# Patient Record
Sex: FEMALE | Race: WHITE | NOT HISPANIC OR LATINO | URBAN - METROPOLITAN AREA
[De-identification: names, ages, dates, MRNs, and addresses within clinical notes are randomized per-mention and may not be internally consistent; named-entity substitution may affect disease eponyms.]

---

## 2018-04-29 ENCOUNTER — INPATIENT (INPATIENT)
Facility: HOSPITAL | Age: 33
LOS: 0 days | Discharge: HOME | DRG: 781 | End: 2018-04-30
Attending: STUDENT IN AN ORGANIZED HEALTH CARE EDUCATION/TRAINING PROGRAM | Admitting: STUDENT IN AN ORGANIZED HEALTH CARE EDUCATION/TRAINING PROGRAM
Payer: COMMERCIAL

## 2018-04-29 VITALS
TEMPERATURE: 100 F | OXYGEN SATURATION: 98 % | DIASTOLIC BLOOD PRESSURE: 80 MMHG | SYSTOLIC BLOOD PRESSURE: 124 MMHG | HEART RATE: 121 BPM | RESPIRATION RATE: 20 BRPM

## 2018-04-29 DIAGNOSIS — N12 TUBULO-INTERSTITIAL NEPHRITIS, NOT SPECIFIED AS ACUTE OR CHRONIC: ICD-10-CM

## 2018-04-29 LAB
ALBUMIN SERPL ELPH-MCNC: 3 G/DL — LOW (ref 3.3–5)
ALBUMIN SERPL ELPH-MCNC: 3.9 G/DL — SIGNIFICANT CHANGE UP (ref 3.3–5)
ALP SERPL-CCNC: 48 U/L — SIGNIFICANT CHANGE UP (ref 40–120)
ALP SERPL-CCNC: 61 U/L — SIGNIFICANT CHANGE UP (ref 40–120)
ALT FLD-CCNC: 18 U/L — SIGNIFICANT CHANGE UP (ref 10–45)
ALT FLD-CCNC: 20 U/L — SIGNIFICANT CHANGE UP (ref 10–45)
ANION GAP SERPL CALC-SCNC: 12 MMOL/L — SIGNIFICANT CHANGE UP (ref 5–17)
ANION GAP SERPL CALC-SCNC: 13 MMOL/L — SIGNIFICANT CHANGE UP (ref 5–17)
APPEARANCE UR: ABNORMAL
AST SERPL-CCNC: 19 U/L — SIGNIFICANT CHANGE UP (ref 10–40)
AST SERPL-CCNC: 28 U/L — SIGNIFICANT CHANGE UP (ref 10–40)
BASE EXCESS BLDV CALC-SCNC: 0.7 MMOL/L — SIGNIFICANT CHANGE UP (ref -2–2)
BASOPHILS # BLD AUTO: 0 K/UL — SIGNIFICANT CHANGE UP (ref 0–0.2)
BASOPHILS NFR BLD AUTO: 0.2 % — SIGNIFICANT CHANGE UP (ref 0–2)
BILIRUB SERPL-MCNC: 0.2 MG/DL — SIGNIFICANT CHANGE UP (ref 0.2–1.2)
BILIRUB SERPL-MCNC: 0.6 MG/DL — SIGNIFICANT CHANGE UP (ref 0.2–1.2)
BILIRUB UR-MCNC: NEGATIVE — SIGNIFICANT CHANGE UP
BLD GP AB SCN SERPL QL: NEGATIVE — SIGNIFICANT CHANGE UP
BUN SERPL-MCNC: 5 MG/DL — LOW (ref 7–23)
BUN SERPL-MCNC: 9 MG/DL — SIGNIFICANT CHANGE UP (ref 7–23)
CA-I SERPL-SCNC: 1.13 MMOL/L — SIGNIFICANT CHANGE UP (ref 1.12–1.3)
CALCIUM SERPL-MCNC: 7.9 MG/DL — LOW (ref 8.4–10.5)
CALCIUM SERPL-MCNC: 9.1 MG/DL — SIGNIFICANT CHANGE UP (ref 8.4–10.5)
CHLORIDE BLDV-SCNC: 106 MMOL/L — SIGNIFICANT CHANGE UP (ref 96–108)
CHLORIDE SERPL-SCNC: 105 MMOL/L — SIGNIFICANT CHANGE UP (ref 96–108)
CHLORIDE SERPL-SCNC: 99 MMOL/L — SIGNIFICANT CHANGE UP (ref 96–108)
CO2 BLDV-SCNC: 25 MMOL/L — SIGNIFICANT CHANGE UP (ref 22–30)
CO2 SERPL-SCNC: 22 MMOL/L — SIGNIFICANT CHANGE UP (ref 22–31)
CO2 SERPL-SCNC: 23 MMOL/L — SIGNIFICANT CHANGE UP (ref 22–31)
COLOR SPEC: YELLOW — SIGNIFICANT CHANGE UP
CREAT SERPL-MCNC: 0.47 MG/DL — LOW (ref 0.5–1.3)
CREAT SERPL-MCNC: 0.53 MG/DL — SIGNIFICANT CHANGE UP (ref 0.5–1.3)
DIFF PNL FLD: ABNORMAL
EOSINOPHIL # BLD AUTO: 0 K/UL — SIGNIFICANT CHANGE UP (ref 0–0.5)
EOSINOPHIL NFR BLD AUTO: 0.3 % — SIGNIFICANT CHANGE UP (ref 0–6)
EPI CELLS # UR: SIGNIFICANT CHANGE UP /HPF
GAS PNL BLDV: 132 MMOL/L — LOW (ref 136–145)
GAS PNL BLDV: SIGNIFICANT CHANGE UP
GAS PNL BLDV: SIGNIFICANT CHANGE UP
GLUCOSE BLDV-MCNC: 116 MG/DL — HIGH (ref 70–99)
GLUCOSE SERPL-MCNC: 121 MG/DL — HIGH (ref 70–99)
GLUCOSE SERPL-MCNC: 170 MG/DL — HIGH (ref 70–99)
GLUCOSE UR QL: NEGATIVE — SIGNIFICANT CHANGE UP
HCG SERPL-ACNC: HIGH MIU/ML (ref 5–24)
HCO3 BLDV-SCNC: 24 MMOL/L — SIGNIFICANT CHANGE UP (ref 21–29)
HCT VFR BLD CALC: 32.1 % — LOW (ref 34.5–45)
HCT VFR BLD CALC: 32.5 % — LOW (ref 34.5–45)
HCT VFR BLD CALC: 39.2 % — SIGNIFICANT CHANGE UP (ref 34.5–45)
HCT VFR BLDA CALC: 40 % — SIGNIFICANT CHANGE UP (ref 39–50)
HGB BLD CALC-MCNC: 13.1 G/DL — SIGNIFICANT CHANGE UP (ref 11.5–15.5)
HGB BLD-MCNC: 11.2 G/DL — LOW (ref 11.5–15.5)
HGB BLD-MCNC: 11.2 G/DL — LOW (ref 11.5–15.5)
HGB BLD-MCNC: 13.4 G/DL — SIGNIFICANT CHANGE UP (ref 11.5–15.5)
KETONES UR-MCNC: ABNORMAL
LACTATE BLDV-MCNC: 1.5 MMOL/L — SIGNIFICANT CHANGE UP (ref 0.7–2)
LACTATE SERPL-SCNC: 1.3 MMOL/L — SIGNIFICANT CHANGE UP (ref 0.7–2)
LEUKOCYTE ESTERASE UR-ACNC: ABNORMAL
LIDOCAIN IGE QN: 14 U/L — SIGNIFICANT CHANGE UP (ref 7–60)
LYMPHOCYTES # BLD AUTO: 0.4 K/UL — LOW (ref 1–3.3)
LYMPHOCYTES # BLD AUTO: 3 % — LOW (ref 13–44)
MCHC RBC-ENTMCNC: 31.3 PG — SIGNIFICANT CHANGE UP (ref 27–34)
MCHC RBC-ENTMCNC: 31.6 PG — SIGNIFICANT CHANGE UP (ref 27–34)
MCHC RBC-ENTMCNC: 32.1 PG — SIGNIFICANT CHANGE UP (ref 27–34)
MCHC RBC-ENTMCNC: 34.1 GM/DL — SIGNIFICANT CHANGE UP (ref 32–36)
MCHC RBC-ENTMCNC: 34.4 GM/DL — SIGNIFICANT CHANGE UP (ref 32–36)
MCHC RBC-ENTMCNC: 34.9 GM/DL — SIGNIFICANT CHANGE UP (ref 32–36)
MCV RBC AUTO: 91.8 FL — SIGNIFICANT CHANGE UP (ref 80–100)
MCV RBC AUTO: 91.8 FL — SIGNIFICANT CHANGE UP (ref 80–100)
MCV RBC AUTO: 91.9 FL — SIGNIFICANT CHANGE UP (ref 80–100)
MONOCYTES # BLD AUTO: 0.7 K/UL — SIGNIFICANT CHANGE UP (ref 0–0.9)
MONOCYTES NFR BLD AUTO: 5.8 % — SIGNIFICANT CHANGE UP (ref 2–14)
NEUTROPHILS # BLD AUTO: 10.6 K/UL — HIGH (ref 1.8–7.4)
NEUTROPHILS NFR BLD AUTO: 90.6 % — HIGH (ref 43–77)
NITRITE UR-MCNC: NEGATIVE — SIGNIFICANT CHANGE UP
OTHER CELLS CSF MANUAL: 14 ML/DL — LOW (ref 18–22)
PCO2 BLDV: 35 MMHG — SIGNIFICANT CHANGE UP (ref 35–50)
PH BLDV: 7.45 — SIGNIFICANT CHANGE UP (ref 7.35–7.45)
PH UR: 6.5 — SIGNIFICANT CHANGE UP (ref 5–8)
PLATELET # BLD AUTO: 129 K/UL — LOW (ref 150–400)
PLATELET # BLD AUTO: 88 K/UL — LOW (ref 150–400)
PLATELET # BLD AUTO: 99 K/UL — LOW (ref 150–400)
PO2 BLDV: 41 MMHG — SIGNIFICANT CHANGE UP (ref 25–45)
POTASSIUM BLDV-SCNC: 3.3 MMOL/L — LOW (ref 3.5–5)
POTASSIUM SERPL-MCNC: 3.2 MMOL/L — LOW (ref 3.5–5.3)
POTASSIUM SERPL-MCNC: 3.5 MMOL/L — SIGNIFICANT CHANGE UP (ref 3.5–5.3)
POTASSIUM SERPL-SCNC: 3.2 MMOL/L — LOW (ref 3.5–5.3)
POTASSIUM SERPL-SCNC: 3.5 MMOL/L — SIGNIFICANT CHANGE UP (ref 3.5–5.3)
PROT SERPL-MCNC: 5.4 G/DL — LOW (ref 6–8.3)
PROT SERPL-MCNC: 7 G/DL — SIGNIFICANT CHANGE UP (ref 6–8.3)
PROT UR-MCNC: 100 MG/DL
RBC # BLD: 3.5 M/UL — LOW (ref 3.8–5.2)
RBC # BLD: 3.54 M/UL — LOW (ref 3.8–5.2)
RBC # BLD: 4.27 M/UL — SIGNIFICANT CHANGE UP (ref 3.8–5.2)
RBC # FLD: 11.2 % — SIGNIFICANT CHANGE UP (ref 10.3–14.5)
RBC CASTS # UR COMP ASSIST: ABNORMAL /HPF (ref 0–2)
RH IG SCN BLD-IMP: POSITIVE — SIGNIFICANT CHANGE UP
SAO2 % BLDV: 74 % — SIGNIFICANT CHANGE UP (ref 67–88)
SODIUM SERPL-SCNC: 135 MMOL/L — SIGNIFICANT CHANGE UP (ref 135–145)
SODIUM SERPL-SCNC: 139 MMOL/L — SIGNIFICANT CHANGE UP (ref 135–145)
SP GR SPEC: >1.03 — HIGH (ref 1.01–1.02)
UROBILINOGEN FLD QL: NEGATIVE — SIGNIFICANT CHANGE UP
WBC # BLD: 11.7 K/UL — HIGH (ref 3.8–10.5)
WBC # BLD: 8.2 K/UL — SIGNIFICANT CHANGE UP (ref 3.8–10.5)
WBC # BLD: 9.6 K/UL — SIGNIFICANT CHANGE UP (ref 3.8–10.5)
WBC # FLD AUTO: 11.7 K/UL — HIGH (ref 3.8–10.5)
WBC # FLD AUTO: 8.2 K/UL — SIGNIFICANT CHANGE UP (ref 3.8–10.5)
WBC # FLD AUTO: 9.6 K/UL — SIGNIFICANT CHANGE UP (ref 3.8–10.5)
WBC UR QL: >50 /HPF (ref 0–5)

## 2018-04-29 PROCEDURE — 76815 OB US LIMITED FETUS(S): CPT | Mod: 26

## 2018-04-29 PROCEDURE — 93010 ELECTROCARDIOGRAM REPORT: CPT | Mod: NC

## 2018-04-29 PROCEDURE — 99285 EMERGENCY DEPT VISIT HI MDM: CPT | Mod: 25

## 2018-04-29 RX ORDER — CEFTRIAXONE 500 MG/1
1 INJECTION, POWDER, FOR SOLUTION INTRAMUSCULAR; INTRAVENOUS EVERY 24 HOURS
Qty: 0 | Refills: 0 | Status: DISCONTINUED | OUTPATIENT
Start: 2018-04-29 | End: 2018-04-30

## 2018-04-29 RX ORDER — NITROFURANTOIN MACROCRYSTAL 50 MG
100 CAPSULE ORAL ONCE
Qty: 0 | Refills: 0 | Status: DISCONTINUED | OUTPATIENT
Start: 2018-04-29 | End: 2018-04-29

## 2018-04-29 RX ORDER — SODIUM CHLORIDE 9 MG/ML
2000 INJECTION INTRAMUSCULAR; INTRAVENOUS; SUBCUTANEOUS ONCE
Qty: 0 | Refills: 0 | Status: COMPLETED | OUTPATIENT
Start: 2018-04-29 | End: 2018-04-29

## 2018-04-29 RX ORDER — ONDANSETRON 8 MG/1
4 TABLET, FILM COATED ORAL ONCE
Qty: 0 | Refills: 0 | Status: COMPLETED | OUTPATIENT
Start: 2018-04-29 | End: 2018-04-29

## 2018-04-29 RX ORDER — CEFTRIAXONE 500 MG/1
1 INJECTION, POWDER, FOR SOLUTION INTRAMUSCULAR; INTRAVENOUS ONCE
Qty: 0 | Refills: 0 | Status: COMPLETED | OUTPATIENT
Start: 2018-04-29 | End: 2018-04-29

## 2018-04-29 RX ORDER — SODIUM CHLORIDE 9 MG/ML
1000 INJECTION, SOLUTION INTRAVENOUS
Qty: 0 | Refills: 0 | Status: DISCONTINUED | OUTPATIENT
Start: 2018-04-29 | End: 2018-04-30

## 2018-04-29 RX ORDER — ACETAMINOPHEN 500 MG
1000 TABLET ORAL ONCE
Qty: 0 | Refills: 0 | Status: COMPLETED | OUTPATIENT
Start: 2018-04-29 | End: 2018-04-29

## 2018-04-29 RX ORDER — CEPHALEXIN 500 MG
500 CAPSULE ORAL ONCE
Qty: 0 | Refills: 0 | Status: DISCONTINUED | OUTPATIENT
Start: 2018-04-29 | End: 2018-04-29

## 2018-04-29 RX ORDER — SODIUM CHLORIDE 9 MG/ML
1000 INJECTION, SOLUTION INTRAVENOUS ONCE
Qty: 0 | Refills: 0 | Status: COMPLETED | OUTPATIENT
Start: 2018-04-29 | End: 2018-04-29

## 2018-04-29 RX ADMIN — Medication 400 MILLIGRAM(S): at 07:22

## 2018-04-29 RX ADMIN — CEFTRIAXONE 33.33 GRAM(S): 500 INJECTION, POWDER, FOR SOLUTION INTRAMUSCULAR; INTRAVENOUS at 13:20

## 2018-04-29 RX ADMIN — SODIUM CHLORIDE 2000 MILLILITER(S): 9 INJECTION INTRAMUSCULAR; INTRAVENOUS; SUBCUTANEOUS at 07:22

## 2018-04-29 RX ADMIN — ONDANSETRON 4 MILLIGRAM(S): 8 TABLET, FILM COATED ORAL at 07:22

## 2018-04-29 RX ADMIN — SODIUM CHLORIDE 100 MILLILITER(S): 9 INJECTION, SOLUTION INTRAVENOUS at 15:40

## 2018-04-29 RX ADMIN — Medication 1000 MILLIGRAM(S): at 09:41

## 2018-04-29 RX ADMIN — CEFTRIAXONE 100 GRAM(S): 500 INJECTION, POWDER, FOR SOLUTION INTRAMUSCULAR; INTRAVENOUS at 09:42

## 2018-04-29 RX ADMIN — SODIUM CHLORIDE 200 MILLILITER(S): 9 INJECTION, SOLUTION INTRAVENOUS at 11:04

## 2018-04-29 RX ADMIN — ONDANSETRON 4 MILLIGRAM(S): 8 TABLET, FILM COATED ORAL at 13:20

## 2018-04-29 RX ADMIN — SODIUM CHLORIDE 1000 MILLILITER(S): 9 INJECTION, SOLUTION INTRAVENOUS at 11:15

## 2018-04-29 NOTE — ED PROVIDER NOTE - MEDICAL DECISION MAKING DETAILS
Christian CHADWICK: 33 yo F, , 12 weeks pregant here for vomiting x 3 days. Had US done 3 days ago which confirmed a live IUP. Pt c/o abdominal tightness and cramping as well as pink discharge here in ED and brown discharge at home. No trauma, heavy lifting. + fever. Denies urinary symptoms. Patient is on Diclegis. Denies history of hyperemesis gravidarum in prior pregnancies. Pt states she will be having a cervical cerclage for this pregnancy. Exam: anxious, non-toxic, RRR, lungs CTA, abd soft, nt, nd, pelvic: no active bleeding, closed os and no adnexal tenderness or cmt. a/p: n/v in pregnancy - will treat with ivf/ anti-emetic, check labs, u/a to r/o uti and reassess. ED US shows live IUP with very small subchorionic hemorrhage. Will continue to evaluate and reassess.

## 2018-04-29 NOTE — ED PROVIDER NOTE - PHYSICAL EXAMINATION
PE: CONSTITUTIONAL: Nontoxic, in mild apparent distress. ENMT: Airway patent, nasal mucosa clear, mouth with dry mucosa. HEAD: NCAT EYES: PERRL, EOMI bilaterally CARDIAC: tachycardic, no m/r/g, no pedal edema RESPIRATORY: CTA bilaterally, no adventitious sounds GI: Abdomen non-distended, non-tender MSK: Spine appears normal, range of motion is not limited, no muscle/joint tenderness NEURO: CNII-XII grossly intact, 5/5 strength, full sensation all extremities, gait intact SKIN: Skin tone normal in color, warm and dry. No evidence of rash. PE: CONSTITUTIONAL: Nontoxic, in mild apparent distress. ENMT: Airway patent, nasal mucosa clear, mouth with dry mucosa. HEAD: NCAT EYES: PERRL, EOMI bilaterally CARDIAC: tachycardic, no m/r/g, no pedal edema RESPIRATORY: CTA bilaterally, no adventitious sounds GI: Abdomen non-distended, mild generalized tenderness, Mild CVAT bilaterally MSK: Spine appears normal, range of motion is not limited, no muscle/joint tenderness NEURO: CNII-XII grossly intact, 5/5 strength, full sensation all extremities, gait intact SKIN: Skin tone normal in color, warm and dry. No evidence of rash.

## 2018-04-29 NOTE — CONSULT NOTE ADULT - SUBJECTIVE AND OBJECTIVE BOX
LENNY GRIJALVA  32y  Female [id number]    HPI:  33 y/o  at 12w1d (by early US OSKAR 11/10/18) here w/ c/o n/v, malaise and back pain.   Patient reports that thus far her pregnancy has been uncomplicated.  She has had mild morning sickness treated with diclegis.  She states that Friday night she began to feel overall unwell and then subsequently began to have severe nausea and vomiting.  Has been unable to hold down anything to eat or drink since then.  Reports emesis has some small specks of blood.  Denies bilious emesis.  Reports associated abdominal tightness - no abdominal cramping or pain.   Reports associated shaking chills and subjective fevers (although the one temp she did take at home was 99).   Reports also experiencing back pain since Saturday.  She reports a hx of chronic back pain and is unsure if this pain is similar to the pain she felt in the past or different.    She denies any HA, blurry vision, CP/SOB, palpitations, RUQ or epigastric pain, diarrhea, constipation, LE swelling  She denies any vaginal bleeding but states she saw some pinkish discharge the other day.  Denies leakage of fluid.  States pre- care up to this point has been uncomplicated.  Ultrascreen performed on Friday- does not have results yet.  States she is scheduled for a cerclage next week due to a hx of     Name of GYN Physician:  Dr. Brunner    POB:  , ,   x 3 - all full term.  pregnancy in  c/b episode of PTL requiring bed rest and tocolytics.  delivered full term.  6 first trimeter losses (one D&C).  been seen by a specialist for recurrent first trimester lossess with no findings found per patient.     Pgyn: +hx of cysts- no interventions.  Denies fibroids,endometriosis, STI's, Abnormal pap smears     Home meds: PNV, Diclegis prn      Allergies    No Known Allergies    Intolerances        PAST MEDICAL & SURGICAL HISTORY:  denies      Social History:  Denies smoking use, drug use, alcohol use.    Vital Signs Last 24 Hrs  T(C): 37.9 (2018 10:18), Max: 37.9 (2018 10:18)  T(F): 100.2 (2018 10:18), Max: 100.2 (2018 10:18)  HR: 122 (2018 11:04) (87 - 122)  BP: 97/44 (2018 11:04) (97/44 - 124/80)  BP(mean): --  RR: 20 (2018 11:04) (18 - 20)  SpO2: 94% (2018 11:04) (94% - 98%)    Physical Exam:   General: sitting comfortably in bed, NAD.  patient feels hot to palpation.   HEENT: neck supple, full ROM  CV: RR S1S2 no m/r/g  Lungs: CTA b/l, good air flow b/l   Back: mild L sided CVA tenderness  Abd: Soft, non-tender, non-distended.  Bowel sounds present.    Speculum Exam: No bleeding from os.  Physiologic discharge.  Cervix closed  Ext: non-tender b/l, no edema     LABS:                              13.4   11.7  )-----------( 129      ( 2018 07:17 )             39.2         135  |  99  |  9   ----------------------------<  121<H>  3.5   |  23  |  0.53    Ca    9.1      2018 07:17  Mg     1.7         TPro  7.0  /  Alb  3.9  /  TBili  0.6  /  DBili  x   /  AST  19  /  ALT  18  /  AlkPhos  61      I&O's Detail      Urinalysis Basic - ( 2018 08:29 )    Color: Yellow / Appearance: SL Turbid / SG: >1.030 / pH: x  Gluc: x / Ketone: Large  / Bili: Negative / Urobili: Negative   Blood: x / Protein: 100 mg/dL / Nitrite: Negative   Leuk Esterase: Small / RBC: 2-5 /HPF / WBC >50 /HPF   Sq Epi: x / Non Sq Epi: Few /HPF / Bacteria: x        RADIOLOGY & ADDITIONAL STUDIES:

## 2018-04-29 NOTE — ED ADULT NURSE NOTE - OBJECTIVE STATEMENT
Pt is a 32YOF  currently 12weeks pregnant received in wheelchair A&Ox4 complaining of vomiting since Friday evening. Pt states that her vomiting started suddenly Friday evening, and has been unable to eat or drink since. Pt notes intermitted "morning sickness" throughout this pregnancy, but nothing "this bad" Pt denies any known sick contacts. Pt states "even the littlest sip of water makes me throw up". Pt denies any blood in vomit, notes abdominal cramping, no vaginal bleeding +vaginal discharge.

## 2018-04-29 NOTE — ED PROVIDER NOTE - PROGRESS NOTE DETAILS
FHR of 167, active fetal motion on bedside ultrasound.  - Justin Bailon MD Spoke with Ob/Gyn, will let Dr. Brunner know about small subchorionic hematoma on ultrasound likely causing small amount of pink bleeding, patient planning to get cerclage later for cervical incompetence. Patient elects for IV rather than PO antibiotics. Patient with chills and rigors, leg cramps - Rectal temp of 100.2F after tylenol. Will draw blood cultures.  - Justin Bailon MD Christian CHADWICK: Patient reassessed - she began to have rigors after IV Abx. No hives, wheezing, airway involvement. She is actively hyperventilating and complaining of cramps. Attempted to calm patient with deep breathing, stopped Abx. Concern for pyelonephritis given rigors. IVF continued. OBGYN consulted. Christian CHADWICK: Patient seen by obgyn and accepted for admission for pyelonephritis. Admit to Dr. Garcia.

## 2018-04-29 NOTE — ED ADULT NURSE REASSESSMENT NOTE - NS ED NURSE REASSESS COMMENT FT1
report received from GABRIELLE Langley. Pt. A+Ox3 sitting up in stretcher. Breathing unlabored on RA. Skin warm pink and dry. IVF and medications to be given as ordered. Family at bedside. blankets provided.
Ceftriaxone stopped by MD Bailon prior to medication being finished due to pending OBGYN consultation.
MD Bailon aware vitals. As per verbal orders by MD Bailon, pt. placed on CM and lactated ringers to be given.
Pt. reports being able to tolerate sips of gingerale but is unable to tolerate "anything else" PO. Pending OBGYN consult. IVF being given as ordered.
Second liter IVF being given as ordered. Pt. ambulatory to bathroom, UA/UC sent as ordered. Pt. states "I already feel a little better."
confirmed with pharmacy that ceftriaxone is okay to give after first dose was stopped by MD after patient having rigors while medication was being administered. Medication given as ordered on pump. Pt. rpeorts feeling "really hot." MD at bedside to reassess, patient given ice packs. pt. also requesting dose of antiemetic, MD aware, medication given as ordered. MD Gonzales aware that patient remains sinus tachycardic, states tele admission is not needed. Pt. admitted to medicine, awaiting bed.

## 2018-04-30 ENCOUNTER — TRANSCRIPTION ENCOUNTER (OUTPATIENT)
Age: 33
End: 2018-04-30

## 2018-04-30 ENCOUNTER — APPOINTMENT (OUTPATIENT)
Dept: ANTEPARTUM | Facility: CLINIC | Age: 33
End: 2018-04-30

## 2018-04-30 ENCOUNTER — ASOB RESULT (OUTPATIENT)
Age: 33
End: 2018-04-30

## 2018-04-30 VITALS
OXYGEN SATURATION: 97 % | SYSTOLIC BLOOD PRESSURE: 88 MMHG | TEMPERATURE: 98 F | HEART RATE: 83 BPM | RESPIRATION RATE: 18 BRPM | DIASTOLIC BLOOD PRESSURE: 52 MMHG

## 2018-04-30 LAB
APTT BLD: 31.1 SEC — SIGNIFICANT CHANGE UP (ref 27.5–37.4)
BASOPHILS # BLD AUTO: 0 K/UL — SIGNIFICANT CHANGE UP (ref 0–0.2)
BASOPHILS NFR BLD AUTO: 0.3 % — SIGNIFICANT CHANGE UP (ref 0–2)
CULTURE RESULTS: NO GROWTH — SIGNIFICANT CHANGE UP
EOSINOPHIL # BLD AUTO: 0.1 K/UL — SIGNIFICANT CHANGE UP (ref 0–0.5)
EOSINOPHIL NFR BLD AUTO: 1 % — SIGNIFICANT CHANGE UP (ref 0–6)
FIBRINOGEN PPP-MCNC: 502 MG/DL — SIGNIFICANT CHANGE UP (ref 310–510)
HAPTOGLOB SERPL-MCNC: 120 MG/DL — SIGNIFICANT CHANGE UP (ref 34–200)
HCT VFR BLD CALC: 31.6 % — LOW (ref 34.5–45)
HGB BLD-MCNC: 10.5 G/DL — LOW (ref 11.5–15.5)
INR BLD: 1.27 RATIO — HIGH (ref 0.88–1.16)
LDH SERPL L TO P-CCNC: 169 U/L — SIGNIFICANT CHANGE UP (ref 50–242)
LYMPHOCYTES # BLD AUTO: 1.2 K/UL — SIGNIFICANT CHANGE UP (ref 1–3.3)
LYMPHOCYTES # BLD AUTO: 17.8 % — SIGNIFICANT CHANGE UP (ref 13–44)
MCHC RBC-ENTMCNC: 31 PG — SIGNIFICANT CHANGE UP (ref 27–34)
MCHC RBC-ENTMCNC: 33.1 GM/DL — SIGNIFICANT CHANGE UP (ref 32–36)
MCV RBC AUTO: 93.6 FL — SIGNIFICANT CHANGE UP (ref 80–100)
MONOCYTES # BLD AUTO: 0.5 K/UL — SIGNIFICANT CHANGE UP (ref 0–0.9)
MONOCYTES NFR BLD AUTO: 7.7 % — SIGNIFICANT CHANGE UP (ref 2–14)
NEUTROPHILS # BLD AUTO: 4.8 K/UL — SIGNIFICANT CHANGE UP (ref 1.8–7.4)
NEUTROPHILS NFR BLD AUTO: 73.2 % — SIGNIFICANT CHANGE UP (ref 43–77)
PLATELET # BLD AUTO: 101 K/UL — LOW (ref 150–400)
PROTHROM AB SERPL-ACNC: 13.8 SEC — HIGH (ref 9.8–12.7)
RBC # BLD: 3.38 M/UL — LOW (ref 3.8–5.2)
RBC # FLD: 11.3 % — SIGNIFICANT CHANGE UP (ref 10.3–14.5)
SPECIMEN SOURCE: SIGNIFICANT CHANGE UP
WBC # BLD: 6.5 K/UL — SIGNIFICANT CHANGE UP (ref 3.8–10.5)
WBC # FLD AUTO: 6.5 K/UL — SIGNIFICANT CHANGE UP (ref 3.8–10.5)

## 2018-04-30 PROCEDURE — 76801 OB US < 14 WKS SINGLE FETUS: CPT | Mod: 26

## 2018-04-30 RX ORDER — ACETAMINOPHEN 500 MG
650 TABLET ORAL ONCE
Qty: 0 | Refills: 0 | Status: COMPLETED | OUTPATIENT
Start: 2018-04-30 | End: 2018-04-30

## 2018-04-30 RX ADMIN — Medication 650 MILLIGRAM(S): at 16:02

## 2018-04-30 RX ADMIN — Medication 650 MILLIGRAM(S): at 15:14

## 2018-04-30 RX ADMIN — CEFTRIAXONE 100 GRAM(S): 500 INJECTION, POWDER, FOR SOLUTION INTRAMUSCULAR; INTRAVENOUS at 13:26

## 2018-04-30 NOTE — DISCHARGE NOTE ANTEPARTUM - CARE PLAN
Principal Discharge DX:	Emesis, persistent  Goal:	wellness  Assessment and plan of treatment:	After discharge, call your ObGyn if you have vaginal bleeding, contractions, loss of fluid like you broke your water or decreased fetal movement.  Also call if you have fevers or chills.  Follow up with your ObGyn within one week.

## 2018-04-30 NOTE — DISCHARGE NOTE ANTEPARTUM - MEDICATION SUMMARY - MEDICATIONS TO TAKE
I will START or STAY ON the medications listed below when I get home from the hospital:    Diclegis 10 mg-10 mg oral delayed release tablet  -- pt home dosing, to continue  -- Indication: For hyperemesis

## 2018-04-30 NOTE — DISCHARGE NOTE ANTEPARTUM - PATIENT PORTAL LINK FT
You can access the [facility] Patient Portal, offered by [facility], by registering with the following website: [email or web address]

## 2018-04-30 NOTE — DISCHARGE NOTE ANTEPARTUM - PLAN OF CARE
wellness After discharge, call your ObGyn if you have vaginal bleeding, contractions, loss of fluid like you broke your water or decreased fetal movement.  Also call if you have fevers or chills.  Follow up with your ObGyn within one week.

## 2018-04-30 NOTE — PROVIDER CONTACT NOTE (OTHER) - ASSESSMENT
pt denies fever, chills, nausea vomiting at this time
Pt asymptomatic. Denies dizziness and fatigue. Pt made aware to inform nurse if feeling dizzy, fatigued or faint.
Pt asymptomatic. Denies dizziness and fatigue. Pt made aware to inform nurse if feeling dizzy, fatigued or faint.

## 2018-04-30 NOTE — DISCHARGE NOTE ANTEPARTUM - CARE PROVIDER_API CALL
Lin Guaman (MD), Obstetrics  Gynecology  [address] [place] [id number]  Phone: [phone]  Fax: [phone]

## 2018-04-30 NOTE — DISCHARGE NOTE ANTEPARTUM - HOSPITAL COURSE
33 yo [id number] presented with emesis abdominal pain at [id number].  Observed, started on ceftriaxone urine culture sent.  Patients pain spontaneously improved, remained afebrile, urine culture resulted negative.  Platelets low at 88 then uptrended to normal range.  ATU sono performed with reassuring fetal status.  Patient discharged home without antibiotics due to negative urine culture, will have close outpatient follow up.

## 2018-05-01 PROCEDURE — 96375 TX/PRO/DX INJ NEW DRUG ADDON: CPT

## 2018-05-01 PROCEDURE — 83615 LACTATE (LD) (LDH) ENZYME: CPT

## 2018-05-01 PROCEDURE — 82435 ASSAY OF BLOOD CHLORIDE: CPT

## 2018-05-01 PROCEDURE — 83690 ASSAY OF LIPASE: CPT

## 2018-05-01 PROCEDURE — 82962 GLUCOSE BLOOD TEST: CPT

## 2018-05-01 PROCEDURE — 86900 BLOOD TYPING SEROLOGIC ABO: CPT

## 2018-05-01 PROCEDURE — 82330 ASSAY OF CALCIUM: CPT

## 2018-05-01 PROCEDURE — 86901 BLOOD TYPING SEROLOGIC RH(D): CPT

## 2018-05-01 PROCEDURE — 76815 OB US LIMITED FETUS(S): CPT

## 2018-05-01 PROCEDURE — 85610 PROTHROMBIN TIME: CPT

## 2018-05-01 PROCEDURE — 87086 URINE CULTURE/COLONY COUNT: CPT

## 2018-05-01 PROCEDURE — 84702 CHORIONIC GONADOTROPIN TEST: CPT

## 2018-05-01 PROCEDURE — 86850 RBC ANTIBODY SCREEN: CPT

## 2018-05-01 PROCEDURE — 85384 FIBRINOGEN ACTIVITY: CPT

## 2018-05-01 PROCEDURE — 87040 BLOOD CULTURE FOR BACTERIA: CPT

## 2018-05-01 PROCEDURE — 85730 THROMBOPLASTIN TIME PARTIAL: CPT

## 2018-05-01 PROCEDURE — 83735 ASSAY OF MAGNESIUM: CPT

## 2018-05-01 PROCEDURE — G0378: CPT

## 2018-05-01 PROCEDURE — 81001 URINALYSIS AUTO W/SCOPE: CPT

## 2018-05-01 PROCEDURE — 85027 COMPLETE CBC AUTOMATED: CPT

## 2018-05-01 PROCEDURE — 96374 THER/PROPH/DIAG INJ IV PUSH: CPT

## 2018-05-01 PROCEDURE — 80053 COMPREHEN METABOLIC PANEL: CPT

## 2018-05-01 PROCEDURE — 82803 BLOOD GASES ANY COMBINATION: CPT

## 2018-05-01 PROCEDURE — 83010 ASSAY OF HAPTOGLOBIN QUANT: CPT

## 2018-05-01 PROCEDURE — 86922 COMPATIBILITY TEST ANTIGLOB: CPT

## 2018-05-01 PROCEDURE — 84132 ASSAY OF SERUM POTASSIUM: CPT

## 2018-05-01 PROCEDURE — 82947 ASSAY GLUCOSE BLOOD QUANT: CPT

## 2018-05-01 PROCEDURE — 84295 ASSAY OF SERUM SODIUM: CPT

## 2018-05-01 PROCEDURE — 83605 ASSAY OF LACTIC ACID: CPT

## 2018-05-01 PROCEDURE — 85014 HEMATOCRIT: CPT

## 2018-05-01 PROCEDURE — 93005 ELECTROCARDIOGRAM TRACING: CPT

## 2018-05-01 PROCEDURE — 99285 EMERGENCY DEPT VISIT HI MDM: CPT | Mod: 25

## 2018-05-02 ENCOUNTER — OUTPATIENT (OUTPATIENT)
Dept: OUTPATIENT SERVICES | Facility: HOSPITAL | Age: 33
LOS: 1 days | End: 2018-05-02
Payer: COMMERCIAL

## 2018-05-02 VITALS
RESPIRATION RATE: 14 BRPM | WEIGHT: 117.95 LBS | SYSTOLIC BLOOD PRESSURE: 96 MMHG | OXYGEN SATURATION: 99 % | HEIGHT: 63 IN | HEART RATE: 95 BPM | TEMPERATURE: 99 F | DIASTOLIC BLOOD PRESSURE: 62 MMHG

## 2018-05-02 DIAGNOSIS — N88.3 INCOMPETENCE OF CERVIX UTERI: ICD-10-CM

## 2018-05-02 DIAGNOSIS — Z01.818 ENCOUNTER FOR OTHER PREPROCEDURAL EXAMINATION: ICD-10-CM

## 2018-05-02 DIAGNOSIS — Z98.890 OTHER SPECIFIED POSTPROCEDURAL STATES: Chronic | ICD-10-CM

## 2018-05-02 DIAGNOSIS — O34.32 MATERNAL CARE FOR CERVICAL INCOMPETENCE, SECOND TRIMESTER: ICD-10-CM

## 2018-05-02 LAB
HCT VFR BLD CALC: 36.3 % — SIGNIFICANT CHANGE UP (ref 34.5–45)
HGB BLD-MCNC: 11.8 G/DL — SIGNIFICANT CHANGE UP (ref 11.5–15.5)
MCHC RBC-ENTMCNC: 29.8 PG — SIGNIFICANT CHANGE UP (ref 27–34)
MCHC RBC-ENTMCNC: 32.5 GM/DL — SIGNIFICANT CHANGE UP (ref 32–36)
MCV RBC AUTO: 91.7 FL — SIGNIFICANT CHANGE UP (ref 80–100)
PLATELET # BLD AUTO: 145 K/UL — LOW (ref 150–400)
POTASSIUM SERPL-MCNC: 3.7 MMOL/L — SIGNIFICANT CHANGE UP (ref 3.5–5.3)
POTASSIUM SERPL-SCNC: 3.7 MMOL/L — SIGNIFICANT CHANGE UP (ref 3.5–5.3)
RBC # BLD: 3.96 M/UL — SIGNIFICANT CHANGE UP (ref 3.8–5.2)
RBC # FLD: 12.9 % — SIGNIFICANT CHANGE UP (ref 10.3–14.5)
WBC # BLD: 9.22 K/UL — SIGNIFICANT CHANGE UP (ref 3.8–10.5)
WBC # FLD AUTO: 9.22 K/UL — SIGNIFICANT CHANGE UP (ref 3.8–10.5)

## 2018-05-02 PROCEDURE — 85027 COMPLETE CBC AUTOMATED: CPT

## 2018-05-02 PROCEDURE — 84132 ASSAY OF SERUM POTASSIUM: CPT

## 2018-05-02 PROCEDURE — G0463: CPT

## 2018-05-02 RX ORDER — SODIUM CHLORIDE 9 MG/ML
3 INJECTION INTRAMUSCULAR; INTRAVENOUS; SUBCUTANEOUS EVERY 8 HOURS
Qty: 0 | Refills: 0 | Status: DISCONTINUED | OUTPATIENT
Start: 2018-05-07 | End: 2018-05-22

## 2018-05-02 RX ORDER — LIDOCAINE HCL 20 MG/ML
0.2 VIAL (ML) INJECTION ONCE
Qty: 0 | Refills: 0 | Status: DISCONTINUED | OUTPATIENT
Start: 2018-05-07 | End: 2018-05-22

## 2018-05-02 NOTE — H&P PST ADULT - ACTIVITY
able to walk up 2 flights of stairs carrying grocery, can run 1 block able to walk up 2 flights of stairs carrying grocery, can run 1 block, dance

## 2018-05-02 NOTE — H&P PST ADULT - ATTENDING COMMENTS
33 yo with known incompetent cervix, admitted for prophylactic cerclage---rbas previously reviewed     glen arias md

## 2018-05-02 NOTE — H&P PST ADULT - HISTORY OF PRESENT ILLNESS
31 yo female. . LMP 2018. IUP at 12+ weeks gestation. PMH multiple missed abortions, diagnosed with incompetent cervix. presents to PST scheduled for cervical cerclage.  pt was recently hospitalized for severe nausea and vomiting, treated with IVF and zofran.  pt stated nausea has been well controlled with diclegis.

## 2018-05-02 NOTE — H&P PST ADULT - NSANTHOSAYNRD_GEN_A_CORE
No. FAIZAN screening performed.  STOP BANG Legend: 0-2 = LOW Risk; 3-4 = INTERMEDIATE Risk; 5-8 = HIGH Risk

## 2018-05-04 LAB
CULTURE RESULTS: SIGNIFICANT CHANGE UP
CULTURE RESULTS: SIGNIFICANT CHANGE UP
SPECIMEN SOURCE: SIGNIFICANT CHANGE UP
SPECIMEN SOURCE: SIGNIFICANT CHANGE UP

## 2018-05-06 ENCOUNTER — TRANSCRIPTION ENCOUNTER (OUTPATIENT)
Age: 33
End: 2018-05-06

## 2018-05-06 ENCOUNTER — OUTPATIENT (OUTPATIENT)
Dept: OUTPATIENT SERVICES | Facility: HOSPITAL | Age: 33
LOS: 1 days | End: 2018-05-06
Payer: COMMERCIAL

## 2018-05-06 DIAGNOSIS — Z3A.00 WEEKS OF GESTATION OF PREGNANCY NOT SPECIFIED: ICD-10-CM

## 2018-05-06 DIAGNOSIS — Z98.890 OTHER SPECIFIED POSTPROCEDURAL STATES: Chronic | ICD-10-CM

## 2018-05-06 DIAGNOSIS — O26.899 OTHER SPECIFIED PREGNANCY RELATED CONDITIONS, UNSPECIFIED TRIMESTER: ICD-10-CM

## 2018-05-06 PROCEDURE — G0463: CPT

## 2018-05-06 RX ORDER — SODIUM CHLORIDE 9 MG/ML
1000 INJECTION, SOLUTION INTRAVENOUS
Qty: 0 | Refills: 0 | Status: DISCONTINUED | OUTPATIENT
Start: 2018-05-07 | End: 2018-05-22

## 2018-05-06 RX ORDER — ACETAMINOPHEN 500 MG
1000 TABLET ORAL ONCE
Qty: 0 | Refills: 0 | Status: DISCONTINUED | OUTPATIENT
Start: 2018-05-07 | End: 2018-05-22

## 2018-05-06 RX ORDER — ONDANSETRON 8 MG/1
4 TABLET, FILM COATED ORAL ONCE
Qty: 0 | Refills: 0 | Status: DISCONTINUED | OUTPATIENT
Start: 2018-05-07 | End: 2018-05-22

## 2018-05-07 ENCOUNTER — OUTPATIENT (OUTPATIENT)
Dept: OUTPATIENT SERVICES | Facility: HOSPITAL | Age: 33
LOS: 1 days | End: 2018-05-07
Payer: COMMERCIAL

## 2018-05-07 ENCOUNTER — TRANSCRIPTION ENCOUNTER (OUTPATIENT)
Age: 33
End: 2018-05-07

## 2018-05-07 VITALS
TEMPERATURE: 98 F | HEART RATE: 82 BPM | SYSTOLIC BLOOD PRESSURE: 103 MMHG | OXYGEN SATURATION: 100 % | RESPIRATION RATE: 18 BRPM | DIASTOLIC BLOOD PRESSURE: 59 MMHG

## 2018-05-07 VITALS
HEIGHT: 63 IN | RESPIRATION RATE: 14 BRPM | OXYGEN SATURATION: 100 % | TEMPERATURE: 98 F | SYSTOLIC BLOOD PRESSURE: 106 MMHG | WEIGHT: 117.95 LBS | DIASTOLIC BLOOD PRESSURE: 69 MMHG | HEART RATE: 94 BPM

## 2018-05-07 DIAGNOSIS — Z98.890 OTHER SPECIFIED POSTPROCEDURAL STATES: Chronic | ICD-10-CM

## 2018-05-07 DIAGNOSIS — O34.32 MATERNAL CARE FOR CERVICAL INCOMPETENCE, SECOND TRIMESTER: ICD-10-CM

## 2018-05-07 PROCEDURE — 59320 REVISION OF CERVIX: CPT

## 2018-05-07 RX ORDER — ACETAMINOPHEN 500 MG
1 TABLET ORAL
Qty: 0 | Refills: 0 | COMMUNITY
Start: 2018-05-07

## 2018-05-07 NOTE — DISCHARGE NOTE ADULT - PLAN OF CARE
Recovery Expect abdominal cramping/pain and mild spotting for the next week. Take Tylenol for pain. Use a pad as needed. Call your physician or go to the emergency room if you experience any of the following: heavy vaginal bleeding (soaking more than 2 pads in 1 hour for 2 hours), leakage of fluid like you broke your water, fever, chills, nausea, vomiting, or pain that is not controlled by medication. Follow-up with Dr. Brunner in 1 week.

## 2018-05-07 NOTE — DISCHARGE NOTE ADULT - NS AS ACTIVITY OBS
Walking-Outdoors allowed/Stairs allowed/No Heavy lifting/straining/Showering allowed/Return to Work/School allowed

## 2018-05-07 NOTE — DISCHARGE NOTE ADULT - CARE PLAN
Principal Discharge DX:	Cervical incompetence in pregnancy  Goal:	Recovery  Assessment and plan of treatment:	Expect abdominal cramping/pain and mild spotting for the next week. Take Tylenol for pain. Use a pad as needed. Call your physician or go to the emergency room if you experience any of the following: heavy vaginal bleeding (soaking more than 2 pads in 1 hour for 2 hours), leakage of fluid like you broke your water, fever, chills, nausea, vomiting, or pain that is not controlled by medication. Follow-up with Dr. Brunner in 1 week.

## 2018-05-07 NOTE — DISCHARGE NOTE ADULT - CARE PROVIDER_API CALL
Christoph Brunner), Obstetrics and Gynecology  [address] [place] [address]  Phone: [phone]  Fax: [phone]

## 2018-05-07 NOTE — ASU PREOP CHECKLIST - TO WHOM
GABRIELLE Dow from GABRIELLE Pecaock @ [facility] GABRIELLE Dow from GABRIELLE Peacock @ 0805 report given by Rachael Berg RN

## 2018-05-07 NOTE — BRIEF OPERATIVE NOTE - OPERATION/FINDINGS
SVE: Anteverted gravid uterus, cervix 1 cm dilated. Speculum exam: cervix open, with fishmouth external os. No membranes seen. Adnexa with no masses. Darling suture placed with minimal bleeding. Pt tolerated procedure well.

## 2018-05-07 NOTE — DISCHARGE NOTE ADULT - PATIENT PORTAL LINK FT
You can access the [facility] Patient Portal, offered by [facility], by registering with the following website: [email or web address]

## 2018-05-07 NOTE — DISCHARGE NOTE ADULT - MEDICATION SUMMARY - MEDICATIONS TO TAKE
I will START or STAY ON the medications listed below when I get home from the hospital:    Tylenol Extra Strength 500 mg oral tablet  -- 1 tab(s) by mouth every 4 hours, As Needed  -- Indication: For Moderate pain, as needed    Diclegis 10 mg-10 mg oral delayed release tablet  -- pt home dosing, to continue  -- Indication: For nausea and vomiting of pregnancy

## 2018-05-07 NOTE — ASU DISCHARGE PLAN (ADULT/PEDIATRIC). - MEDICATION SUMMARY - MEDICATIONS TO TAKE
I will START or STAY ON the medications listed below when I get home from the hospital:    Tylenol Extra Strength 500 mg oral tablet  -- 1 tab(s) by mouth every 4 hours, As Needed  -- Indication: For for moderate pain, as needed    Diclegis 10 mg-10 mg oral delayed release tablet  -- pt home dosing, to continue  -- Indication: For nausea and vomiting of pregnancy

## 2018-05-07 NOTE — ASU DISCHARGE PLAN (ADULT/PEDIATRIC). - NOTIFY
Inability to Tolerate Liquids or Foods/GYN Fever>100.4/Bleeding that does not stop/Unable to Urinate/Pain not relieved by Medications/Persistent Nausea and Vomiting

## 2018-05-07 NOTE — ASU DISCHARGE PLAN (ADULT/PEDIATRIC). - ITEMS TO FOLLOWUP WITH YOUR PHYSICIAN'S
Expect some mild abdominal cramping/pain and mild spotting for the next few days. Take Tylenol for cramping. Use a pad as needed. Call your physician or go to the emergency room if you experience any of the following: heavy vaginal bleeding (soaking more than 2 pads in 1 hour for 2 hours), leakage of fluid like you broke your water, fever, chills, nausea, vomiting, or pain that is not controlled by medication. Follow-up with Dr. Brunner on 5/17/18.

## 2018-05-16 ENCOUNTER — OUTPATIENT (OUTPATIENT)
Dept: OUTPATIENT SERVICES | Facility: HOSPITAL | Age: 33
LOS: 1 days | End: 2018-05-16
Payer: COMMERCIAL

## 2018-05-16 ENCOUNTER — TRANSCRIPTION ENCOUNTER (OUTPATIENT)
Age: 33
End: 2018-05-16

## 2018-05-16 VITALS
RESPIRATION RATE: 16 BRPM | WEIGHT: 117.95 LBS | TEMPERATURE: 98 F | HEART RATE: 94 BPM | SYSTOLIC BLOOD PRESSURE: 116 MMHG | DIASTOLIC BLOOD PRESSURE: 70 MMHG | OXYGEN SATURATION: 98 % | HEIGHT: 64 IN

## 2018-05-16 DIAGNOSIS — O02.1 MISSED ABORTION: ICD-10-CM

## 2018-05-16 DIAGNOSIS — Z98.890 OTHER SPECIFIED POSTPROCEDURAL STATES: Chronic | ICD-10-CM

## 2018-05-16 DIAGNOSIS — O34.30 MATERNAL CARE FOR CERVICAL INCOMPETENCE, UNSPECIFIED TRIMESTER: Chronic | ICD-10-CM

## 2018-05-16 DIAGNOSIS — O34.32 MATERNAL CARE FOR CERVICAL INCOMPETENCE, SECOND TRIMESTER: Chronic | ICD-10-CM

## 2018-05-16 DIAGNOSIS — O36.4XX1 MATERNAL CARE FOR INTRAUTERINE DEATH, FETUS 1: ICD-10-CM

## 2018-05-16 LAB
APTT BLD: 30.1 SEC — SIGNIFICANT CHANGE UP (ref 27.5–37.4)
BLD GP AB SCN SERPL QL: NEGATIVE — SIGNIFICANT CHANGE UP
FIBRINOGEN PPP-MCNC: 618 MG/DL — HIGH (ref 310–510)
INR BLD: 0.9 RATIO — SIGNIFICANT CHANGE UP (ref 0.88–1.16)
PROTHROM AB SERPL-ACNC: 10.2 SEC — SIGNIFICANT CHANGE UP (ref 10–13.1)
RH IG SCN BLD-IMP: POSITIVE — SIGNIFICANT CHANGE UP

## 2018-05-16 PROCEDURE — 86900 BLOOD TYPING SEROLOGIC ABO: CPT

## 2018-05-16 PROCEDURE — 86922 COMPATIBILITY TEST ANTIGLOB: CPT

## 2018-05-16 PROCEDURE — 85384 FIBRINOGEN ACTIVITY: CPT

## 2018-05-16 PROCEDURE — 86901 BLOOD TYPING SEROLOGIC RH(D): CPT

## 2018-05-16 PROCEDURE — G0463: CPT

## 2018-05-16 PROCEDURE — 85610 PROTHROMBIN TIME: CPT

## 2018-05-16 PROCEDURE — 85730 THROMBOPLASTIN TIME PARTIAL: CPT

## 2018-05-16 PROCEDURE — 86850 RBC ANTIBODY SCREEN: CPT

## 2018-05-16 RX ORDER — DOXYLAMINE SUCCINATE AND PYRIDOXINE HYDROCHLORIDE, DELAYED RELEASE TABLETS 10 MG/10 MG 10; 10 MG/1; MG/1
2 TABLET, DELAYED RELEASE ORAL
Qty: 0 | Refills: 0 | COMMUNITY

## 2018-05-16 RX ORDER — LIDOCAINE HCL 20 MG/ML
0.2 VIAL (ML) INJECTION ONCE
Qty: 0 | Refills: 0 | Status: DISCONTINUED | OUTPATIENT
Start: 2018-05-17 | End: 2018-06-01

## 2018-05-16 RX ORDER — SODIUM CHLORIDE 9 MG/ML
3 INJECTION INTRAMUSCULAR; INTRAVENOUS; SUBCUTANEOUS EVERY 8 HOURS
Qty: 0 | Refills: 0 | Status: DISCONTINUED | OUTPATIENT
Start: 2018-05-17 | End: 2018-06-01

## 2018-05-16 NOTE — H&P PST ADULT - PROBLEM SELECTOR PLAN 1
suction curettage for fetal demise with sono guidance on 5/17/18  PST instructions provided, patient verbalized understanding.   CBC in sunrise ( 5/2/18) , coags, fibrinogen assay , T&S collected and send.

## 2018-05-16 NOTE — H&P PST ADULT - PSH
History of episiotomy    S/P D&C (status post dilation and curettage) Cervical cerclage suture present  5/2018  History of episiotomy    S/P D&C (status post dilation and curettage)  multiple

## 2018-05-16 NOTE — H&P PST ADULT - PMH
Acute gastric ulcer without hemorrhage or perforation  treated with medication, currently resolved Acute gastric ulcer without hemorrhage or perforation  treated with medication, currently resolved  Missed  with fetal demise before 20 completed weeks of gestation

## 2018-05-16 NOTE — H&P PST ADULT - HISTORY OF PRESENT ILLNESS
31 yo female. . LMP 2018. IUP at 12+ weeks gestation. PMH multiple missed abortions, diagnosed with incompetent cervix. presents to PST scheduled for cervical cerclage.  pt was recently hospitalized for severe nausea and vomiting, treated with IVF and zofran.  pt stated nausea has been well controlled with diclegis. 33 yo female,  with h/o multiple missed abortions, D&C, incompetent cervix,   , s/p cervical cerclage 18,  about 14 weeks of gestation, presents to PST for scheduled suction curettage for fetal demise with sono guidance on 18. Denies fever, chills, no acute complaints.

## 2018-05-17 ENCOUNTER — RESULT REVIEW (OUTPATIENT)
Age: 33
End: 2018-05-17

## 2018-05-17 ENCOUNTER — OUTPATIENT (OUTPATIENT)
Dept: OUTPATIENT SERVICES | Facility: HOSPITAL | Age: 33
LOS: 1 days | End: 2018-05-17
Payer: COMMERCIAL

## 2018-05-17 VITALS
SYSTOLIC BLOOD PRESSURE: 111 MMHG | HEART RATE: 88 BPM | RESPIRATION RATE: 18 BRPM | OXYGEN SATURATION: 98 % | DIASTOLIC BLOOD PRESSURE: 67 MMHG

## 2018-05-17 VITALS
SYSTOLIC BLOOD PRESSURE: 95 MMHG | WEIGHT: 117.95 LBS | OXYGEN SATURATION: 98 % | DIASTOLIC BLOOD PRESSURE: 68 MMHG | RESPIRATION RATE: 16 BRPM | HEART RATE: 81 BPM | TEMPERATURE: 99 F | HEIGHT: 64 IN

## 2018-05-17 DIAGNOSIS — Z98.890 OTHER SPECIFIED POSTPROCEDURAL STATES: Chronic | ICD-10-CM

## 2018-05-17 DIAGNOSIS — O36.4XX1 MATERNAL CARE FOR INTRAUTERINE DEATH, FETUS 1: ICD-10-CM

## 2018-05-17 DIAGNOSIS — O34.30 MATERNAL CARE FOR CERVICAL INCOMPETENCE, UNSPECIFIED TRIMESTER: Chronic | ICD-10-CM

## 2018-05-17 DIAGNOSIS — O23.01 INFECTIONS OF KIDNEY IN PREGNANCY, FIRST TRIMESTER: ICD-10-CM

## 2018-05-17 PROCEDURE — 59820 CARE OF MISCARRIAGE: CPT

## 2018-05-17 PROCEDURE — 88305 TISSUE EXAM BY PATHOLOGIST: CPT | Mod: 26

## 2018-05-17 PROCEDURE — 88305 TISSUE EXAM BY PATHOLOGIST: CPT

## 2018-05-17 RX ORDER — CEFAZOLIN SODIUM 1 G
2000 VIAL (EA) INJECTION ONCE
Qty: 0 | Refills: 0 | Status: COMPLETED | OUTPATIENT
Start: 2018-05-17 | End: 2018-05-17

## 2018-05-17 RX ORDER — ACETAMINOPHEN 500 MG
1000 TABLET ORAL ONCE
Qty: 0 | Refills: 0 | Status: COMPLETED | OUTPATIENT
Start: 2018-05-17 | End: 2018-05-17

## 2018-05-17 RX ORDER — APREPITANT 80 MG/1
40 CAPSULE ORAL ONCE
Qty: 0 | Refills: 0 | Status: COMPLETED | OUTPATIENT
Start: 2018-05-17 | End: 2018-05-17

## 2018-05-17 RX ORDER — IBUPROFEN 200 MG
2 TABLET ORAL
Qty: 0 | Refills: 0 | COMMUNITY

## 2018-05-17 RX ORDER — ONDANSETRON 8 MG/1
4 TABLET, FILM COATED ORAL ONCE
Qty: 0 | Refills: 0 | Status: DISCONTINUED | OUTPATIENT
Start: 2018-05-17 | End: 2018-06-01

## 2018-05-17 RX ORDER — CELECOXIB 200 MG/1
200 CAPSULE ORAL ONCE
Qty: 0 | Refills: 0 | Status: DISCONTINUED | OUTPATIENT
Start: 2018-05-17 | End: 2018-06-01

## 2018-05-17 RX ORDER — ACETAMINOPHEN 500 MG
2 TABLET ORAL
Qty: 0 | Refills: 0 | COMMUNITY

## 2018-05-17 RX ORDER — SODIUM CHLORIDE 9 MG/ML
1000 INJECTION, SOLUTION INTRAVENOUS
Qty: 0 | Refills: 0 | Status: DISCONTINUED | OUTPATIENT
Start: 2018-05-17 | End: 2018-06-01

## 2018-05-17 RX ORDER — CELECOXIB 200 MG/1
200 CAPSULE ORAL ONCE
Qty: 0 | Refills: 0 | Status: COMPLETED | OUTPATIENT
Start: 2018-05-17 | End: 2018-05-17

## 2018-05-17 RX ADMIN — APREPITANT 40 MILLIGRAM(S): 80 CAPSULE ORAL at 12:39

## 2018-05-17 RX ADMIN — Medication 400 MILLIGRAM(S): at 15:13

## 2018-05-17 NOTE — BRIEF OPERATIVE NOTE - OPERATION/FINDINGS
EUA revealed anteverted uterus approximately 13 weeks in size. Adnexa non palpable bilaterally. Dilation and vacuum curettage was performed under ultrasound guidance to evacuate the contents of the uterus completely. Minimal bleeding noted.

## 2018-05-17 NOTE — BRIEF OPERATIVE NOTE - PROCEDURE
<<-----Click on this checkbox to enter Procedure Dilation and curettage of uterus for missed   2018    Active  RBUESER

## 2018-05-17 NOTE — ASU DISCHARGE PLAN (ADULT/PEDIATRIC). - ACTIVITY LEVEL
no douching/nothing per vagina/no tub baths/no heavy lifting/weight bearing as tolerated/no tampons/no intercourse

## 2018-05-17 NOTE — ASU DISCHARGE PLAN (ADULT/PEDIATRIC). - MEDICATION SUMMARY - MEDICATIONS TO TAKE
I will START or STAY ON the medications listed below when I get home from the hospital:    Tylenol 325 mg oral tablet  -- 2 tab(s) by mouth every 4 hours, As Needed  -- Indication: For Moderate pain, as needed    ibuprofen 200 mg oral tablet  -- 2 tab(s) by mouth every 6 hours, As Needed  -- Indication: For Moderate pain, as needed

## 2018-05-17 NOTE — ASU PATIENT PROFILE, ADULT - PMH
Acute gastric ulcer without hemorrhage or perforation  treated with medication, currently resolved  Missed  with fetal demise before 20 completed weeks of gestation

## 2018-05-17 NOTE — ASU PATIENT PROFILE, ADULT - PSH
Cervical cerclage suture present  5/2018  History of episiotomy    S/P D&C (status post dilation and curettage)  multiple

## 2018-05-17 NOTE — ASU DISCHARGE PLAN (ADULT/PEDIATRIC). - ITEMS TO FOLLOWUP WITH YOUR PHYSICIAN'S
Expect abdominal cramping/pain and spotting for the next week. Take ibuprofen and Tylenol for cramping. Use a pad as needed. Call your physician or go to the emergency room if you experience any of the following: heavy vaginal bleeding (soaking more than 2 pads in 1 hour for 2 hours), fever, chills, nausea, vomiting, or pain that is not controlled by medication. Follow-up with Dr. Garcia in 2 weeks.

## 2018-05-17 NOTE — ASU DISCHARGE PLAN (ADULT/PEDIATRIC). - NOTIFY
Bleeding that does not stop/Unable to Urinate/Persistent Nausea and Vomiting/Pain not relieved by Medications/GYN Fever>100.4

## 2018-06-04 ENCOUNTER — RECORD ABSTRACTING (OUTPATIENT)
Age: 33
End: 2018-06-04

## 2018-06-04 DIAGNOSIS — N96 RECURRENT PREGNANCY LOSS: ICD-10-CM

## 2018-06-04 DIAGNOSIS — Z31.69 ENCOUNTER FOR OTHER GENERAL COUNSELING AND ADVICE ON PROCREATION: ICD-10-CM

## 2018-06-28 ENCOUNTER — APPOINTMENT (OUTPATIENT)
Dept: MATERNAL FETAL MEDICINE | Facility: CLINIC | Age: 33
End: 2018-06-28
Payer: COMMERCIAL

## 2018-06-28 PROCEDURE — 99245 OFF/OP CONSLTJ NEW/EST HI 55: CPT

## 2018-09-12 ENCOUNTER — OTHER (OUTPATIENT)
Age: 33
End: 2018-09-12

## 2018-09-12 ENCOUNTER — RECORD ABSTRACTING (OUTPATIENT)
Age: 33
End: 2018-09-12

## 2018-09-12 DIAGNOSIS — R11.2 NAUSEA WITH VOMITING, UNSPECIFIED: ICD-10-CM

## 2018-09-12 RX ORDER — DOXYLAMINE SUCCINATE AND PYRIDOXINE HYDROCHLORIDE 10; 10 MG/1; MG/1
10-10 TABLET, DELAYED RELEASE ORAL EVERY 6 HOURS
Qty: 60 | Refills: 2 | Status: ACTIVE | COMMUNITY
Start: 2018-09-12 | End: 1900-01-01

## 2018-09-14 ENCOUNTER — ASOB RESULT (OUTPATIENT)
Age: 33
End: 2018-09-14

## 2018-09-14 ENCOUNTER — APPOINTMENT (OUTPATIENT)
Dept: ANTEPARTUM | Facility: CLINIC | Age: 33
End: 2018-09-14
Payer: COMMERCIAL

## 2018-09-14 PROBLEM — K25.3 ACUTE GASTRIC ULCER WITHOUT HEMORRHAGE OR PERFORATION: Chronic | Status: ACTIVE | Noted: 2018-05-02

## 2018-09-14 PROBLEM — O02.1 MISSED ABORTION: Chronic | Status: ACTIVE | Noted: 2018-05-16

## 2018-09-14 PROCEDURE — 76801 OB US < 14 WKS SINGLE FETUS: CPT

## 2018-09-14 PROCEDURE — 99214 OFFICE O/P EST MOD 30 MIN: CPT | Mod: 25

## 2018-09-14 PROCEDURE — 76817 TRANSVAGINAL US OBSTETRIC: CPT

## 2018-10-10 ENCOUNTER — ASOB RESULT (OUTPATIENT)
Age: 33
End: 2018-10-10

## 2018-10-10 ENCOUNTER — APPOINTMENT (OUTPATIENT)
Dept: ANTEPARTUM | Facility: CLINIC | Age: 33
End: 2018-10-10
Payer: COMMERCIAL

## 2018-10-10 PROCEDURE — 99214 OFFICE O/P EST MOD 30 MIN: CPT | Mod: 25

## 2018-10-10 PROCEDURE — 76801 OB US < 14 WKS SINGLE FETUS: CPT

## 2018-11-12 ENCOUNTER — RECORD ABSTRACTING (OUTPATIENT)
Age: 33
End: 2018-11-12

## 2018-11-12 DIAGNOSIS — L29.0 PRURITUS ANI: ICD-10-CM

## 2018-11-12 DIAGNOSIS — Z87.19 PERSONAL HISTORY OF OTHER DISEASES OF THE DIGESTIVE SYSTEM: ICD-10-CM

## 2018-11-12 DIAGNOSIS — R06.09 OTHER FORMS OF DYSPNEA: ICD-10-CM

## 2018-11-12 DIAGNOSIS — K21.9 DIAPHRAGMATIC HERNIA W/OUT OBSTRUCTION OR GANGRENE: ICD-10-CM

## 2018-11-12 DIAGNOSIS — K44.9 DIAPHRAGMATIC HERNIA W/OUT OBSTRUCTION OR GANGRENE: ICD-10-CM

## 2018-11-12 DIAGNOSIS — Z78.9 OTHER SPECIFIED HEALTH STATUS: ICD-10-CM

## 2018-11-12 DIAGNOSIS — N32.81 OVERACTIVE BLADDER: ICD-10-CM

## 2018-11-15 ENCOUNTER — INPATIENT (INPATIENT)
Facility: HOSPITAL | Age: 33
LOS: 0 days | Discharge: HOME | DRG: 776 | End: 2018-11-16
Attending: OBSTETRICS & GYNECOLOGY | Admitting: OBSTETRICS & GYNECOLOGY
Payer: COMMERCIAL

## 2018-11-15 ENCOUNTER — RESULT REVIEW (OUTPATIENT)
Age: 33
End: 2018-11-15

## 2018-11-15 VITALS
RESPIRATION RATE: 15 BRPM | HEART RATE: 60 BPM | OXYGEN SATURATION: 98 % | SYSTOLIC BLOOD PRESSURE: 132 MMHG | DIASTOLIC BLOOD PRESSURE: 76 MMHG

## 2018-11-15 DIAGNOSIS — Z98.890 OTHER SPECIFIED POSTPROCEDURAL STATES: Chronic | ICD-10-CM

## 2018-11-15 DIAGNOSIS — O02.1 MISSED ABORTION: ICD-10-CM

## 2018-11-15 DIAGNOSIS — N93.9 ABNORMAL UTERINE AND VAGINAL BLEEDING, UNSPECIFIED: ICD-10-CM

## 2018-11-15 DIAGNOSIS — O34.30 MATERNAL CARE FOR CERVICAL INCOMPETENCE, UNSPECIFIED TRIMESTER: Chronic | ICD-10-CM

## 2018-11-15 LAB
ALBUMIN SERPL ELPH-MCNC: 3.8 G/DL — SIGNIFICANT CHANGE UP (ref 3.3–5)
ALP SERPL-CCNC: 31 U/L — LOW (ref 40–120)
ALT FLD-CCNC: 13 U/L — SIGNIFICANT CHANGE UP (ref 10–45)
ANION GAP SERPL CALC-SCNC: 11 MMOL/L — SIGNIFICANT CHANGE UP (ref 5–17)
APTT BLD: 26.5 SEC — LOW (ref 27.5–36.3)
AST SERPL-CCNC: 12 U/L — SIGNIFICANT CHANGE UP (ref 10–40)
BASOPHILS # BLD AUTO: 0.1 K/UL — SIGNIFICANT CHANGE UP (ref 0–0.2)
BASOPHILS NFR BLD AUTO: 1.2 % — SIGNIFICANT CHANGE UP (ref 0–2)
BILIRUB SERPL-MCNC: 0.3 MG/DL — SIGNIFICANT CHANGE UP (ref 0.2–1.2)
BLD GP AB SCN SERPL QL: NEGATIVE — SIGNIFICANT CHANGE UP
BUN SERPL-MCNC: 9 MG/DL — SIGNIFICANT CHANGE UP (ref 7–23)
CALCIUM SERPL-MCNC: 8.5 MG/DL — SIGNIFICANT CHANGE UP (ref 8.4–10.5)
CHLORIDE SERPL-SCNC: 106 MMOL/L — SIGNIFICANT CHANGE UP (ref 96–108)
CO2 SERPL-SCNC: 21 MMOL/L — LOW (ref 22–31)
CREAT SERPL-MCNC: 0.66 MG/DL — SIGNIFICANT CHANGE UP (ref 0.5–1.3)
EOSINOPHIL # BLD AUTO: 0.1 K/UL — SIGNIFICANT CHANGE UP (ref 0–0.5)
EOSINOPHIL NFR BLD AUTO: 1.4 % — SIGNIFICANT CHANGE UP (ref 0–6)
GAS PNL BLDV: SIGNIFICANT CHANGE UP
GLUCOSE SERPL-MCNC: 120 MG/DL — HIGH (ref 70–99)
HCG SERPL-ACNC: POSITIVE MIU/ML — SIGNIFICANT CHANGE UP
HCT VFR BLD CALC: 34.3 % — LOW (ref 34.5–45)
HCT VFR BLD CALC: 35.4 % — SIGNIFICANT CHANGE UP (ref 34.5–45)
HGB BLD-MCNC: 11.6 G/DL — SIGNIFICANT CHANGE UP (ref 11.5–15.5)
HGB BLD-MCNC: 12 G/DL — SIGNIFICANT CHANGE UP (ref 11.5–15.5)
INR BLD: 1.03 RATIO — SIGNIFICANT CHANGE UP (ref 0.88–1.16)
LYMPHOCYTES # BLD AUTO: 2.2 K/UL — SIGNIFICANT CHANGE UP (ref 1–3.3)
LYMPHOCYTES # BLD AUTO: 33.2 % — SIGNIFICANT CHANGE UP (ref 13–44)
MCHC RBC-ENTMCNC: 30.6 PG — SIGNIFICANT CHANGE UP (ref 27–34)
MCHC RBC-ENTMCNC: 30.9 PG — SIGNIFICANT CHANGE UP (ref 27–34)
MCHC RBC-ENTMCNC: 33.8 GM/DL — SIGNIFICANT CHANGE UP (ref 32–36)
MCHC RBC-ENTMCNC: 33.8 GM/DL — SIGNIFICANT CHANGE UP (ref 32–36)
MCV RBC AUTO: 90.5 FL — SIGNIFICANT CHANGE UP (ref 80–100)
MCV RBC AUTO: 91.5 FL — SIGNIFICANT CHANGE UP (ref 80–100)
MONOCYTES # BLD AUTO: 0.6 K/UL — SIGNIFICANT CHANGE UP (ref 0–0.9)
MONOCYTES NFR BLD AUTO: 8.6 % — SIGNIFICANT CHANGE UP (ref 2–14)
NEUTROPHILS # BLD AUTO: 3.6 K/UL — SIGNIFICANT CHANGE UP (ref 1.8–7.4)
NEUTROPHILS NFR BLD AUTO: 55.6 % — SIGNIFICANT CHANGE UP (ref 43–77)
PLATELET # BLD AUTO: 210 K/UL — SIGNIFICANT CHANGE UP (ref 150–400)
PLATELET # BLD AUTO: ABNORMAL (ref 150–400)
POTASSIUM SERPL-MCNC: 3.5 MMOL/L — SIGNIFICANT CHANGE UP (ref 3.5–5.3)
POTASSIUM SERPL-SCNC: 3.5 MMOL/L — SIGNIFICANT CHANGE UP (ref 3.5–5.3)
PROT SERPL-MCNC: 5.7 G/DL — LOW (ref 6–8.3)
PROTHROM AB SERPL-ACNC: 11.8 SEC — SIGNIFICANT CHANGE UP (ref 10–12.9)
RBC # BLD: 3.75 M/UL — LOW (ref 3.8–5.2)
RBC # BLD: 3.91 M/UL — SIGNIFICANT CHANGE UP (ref 3.8–5.2)
RBC # FLD: 12.1 % — SIGNIFICANT CHANGE UP (ref 10.3–14.5)
RBC # FLD: 13.1 % — SIGNIFICANT CHANGE UP (ref 10.3–14.5)
RH IG SCN BLD-IMP: POSITIVE — SIGNIFICANT CHANGE UP
SODIUM SERPL-SCNC: 138 MMOL/L — SIGNIFICANT CHANGE UP (ref 135–145)
WBC # BLD: 6.5 K/UL — SIGNIFICANT CHANGE UP (ref 3.8–10.5)
WBC # BLD: 7.8 K/UL — SIGNIFICANT CHANGE UP (ref 3.8–10.5)
WBC # FLD AUTO: 6.5 K/UL — SIGNIFICANT CHANGE UP (ref 3.8–10.5)
WBC # FLD AUTO: 7.8 K/UL — SIGNIFICANT CHANGE UP (ref 3.8–10.5)

## 2018-11-15 PROCEDURE — 93975 VASCULAR STUDY: CPT | Mod: 26

## 2018-11-15 PROCEDURE — 93010 ELECTROCARDIOGRAM REPORT: CPT

## 2018-11-15 PROCEDURE — 76817 TRANSVAGINAL US OBSTETRIC: CPT | Mod: 26

## 2018-11-15 PROCEDURE — 99291 CRITICAL CARE FIRST HOUR: CPT

## 2018-11-15 PROCEDURE — 72197 MRI PELVIS W/O & W/DYE: CPT | Mod: 26

## 2018-11-15 RX ORDER — SODIUM CHLORIDE 9 MG/ML
1000 INJECTION, SOLUTION INTRAVENOUS
Qty: 0 | Refills: 0 | Status: DISCONTINUED | OUTPATIENT
Start: 2018-11-15 | End: 2018-11-16

## 2018-11-15 RX ADMIN — SODIUM CHLORIDE 125 MILLILITER(S): 9 INJECTION, SOLUTION INTRAVENOUS at 23:30

## 2018-11-15 NOTE — H&P ADULT - NSHPLABSRESULTS_GEN_ALL_CORE
12.0                  Neurophils% (auto):   x      (11-15 @ 17:34):    7.8  )-----------(CLUMPED        Lymphocytes% (auto):  x                                             35.4                   Eosinphils% (auto):   x        Manual%: Neutrophils x    ; Lymphocytes x    ; Eosinophils x    ; Bands%: x    ; Blasts x          11-15    138  |  106  |  9   ----------------------------<  120<H>  3.5   |  21<L>  |  0.66    Ca    8.5      15 Nov 2018 11:57    TPro  5.7<L>  /  Alb  3.8  /  TBili  0.3  /  DBili  x   /  AST  12  /  ALT  13  /  AlkPhos  31<L>  11-15    ( 11-15 @ 11:57 )   PT: 11.8 sec;   INR: 1.03 ratio  aPTT: 26.5 sec      VBG - ( 15 Nov 2018 11:57 )  pH: 7.36  /  pCO2: 44    /  pO2: <50   / HCO3: 24    / Base Excess: -1.1  /  SvO2: 70    / Lactate: 1.8

## 2018-11-15 NOTE — ED PROVIDER NOTE - MEDICAL DECISION MAKING DETAILS
Attending MD Zarco: 33F sp D&C for missed AB 3 weeks ago presenting with brisk vaginal bleeding from GYN's office. Patient arrived pale, minimally responsive, cool extremities SBPs in 100s with large volume of blood stained scrub bottoms. GYN stat consulted, emergency release blood transfusing.

## 2018-11-15 NOTE — H&P ADULT - HISTORY OF PRESENT ILLNESS
33y  LMP in mid July status post D&C for a 10 week pregnancy 5 weeks ago who presents with vaginal bleeding since 18. She notes that she was passing clots and then the bleeding stopped yesterday and resumed this morning. She went into Dr. Ro's office for heavy bleeding. Dr. Ro attempted to remove a clot, but the patient continued to have heavy bleeding, so she sent her to the ED. The patient noted feeling lightheaded upon arrival in the ED, she passed out and a blood transfusion was started. She denies any fevers, chills, nausea, vomiting, chest pain, shortness of breath, constipation, diarrhea, dysuria, and vaginal discharge.      OB/GYN HISTORY: denies history of fibroids, STDs and abnormal paps, she has a history of ovarian cysts, 6x SABs, 3x MAB w/ D&Cs, 3x

## 2018-11-15 NOTE — ED PROVIDER NOTE - PROGRESS NOTE DETAILS
Attending MD Zarco: marked improved, mentated well sp emergency 2u pRBCs. OBGYN recommending TVUS to eval retained POC, will monitor patient a bit longer to ensure stable prior to sending to radiology.

## 2018-11-15 NOTE — ED PROVIDER NOTE - PHYSICAL EXAMINATION
Attending MD Zarco: A & O x 3, drowsy, pale, intermittently unresponsive, ill appearing, EOMI b/l, PERRL b/l; lungs CTAB, heart with reg rhythm without murmur; abdomen soft NTND; extremities warm with no edema; affect appropriate. neuro exam non focal with no gross motor or sensory deficits.     fresh blood coating scrub bottoms

## 2018-11-15 NOTE — H&P ADULT - NSHPPHYSICALEXAM_GEN_ALL_CORE
Constitutional: alert and oriented x 3      Respiratory: clear    Cardiovascular: regular rate and rhythm    Gastrointestinal: soft, non tender, no palpable masses    SSE: 200 cc of clot removed from the vagina, small clot removed from the cervix, light bleeding noted from the cervical os  SVE: os fingertip dilated, uterus of approximately 6 weeks size

## 2018-11-15 NOTE — ED ADULT NURSE REASSESSMENT NOTE - NS ED NURSE REASSESS COMMENT FT1
Report received from       GABRIELLE Alicia  Pt resting comfortably with family at bedside.   Awaiting GTN bed  pt changed, new pads and underwear in place, assisted to bedside toilet  Safety maintained at all times, bed in lowest position, call bell in hand.  Will continue to monitor closely.
pt off to MRI
2 units of emergent PRBC being administered per MD orders. Two RNs at bedside for confirmation of blood product.
Pt. back from radiology awaiting results. VSS. Provided patient with mouth swabs. Pt. verbalizes feeling much better. comfort and safety provided.

## 2018-11-15 NOTE — ED PROVIDER NOTE - OBJECTIVE STATEMENT
33F with no past medical history presents with vaginal bleeding.  Sent from her OBGYN office where she was found weak and pale.  Initially had bleeding on Monday with clots that resolved the next day, bleeding started again today with clots so went to her OBGYN office.  Had D&C for missed AB 3 weeks ago, has been well since.  EMS states patient had syncopal episode en route from OBGYN with dry heaving.  Arrives to ED unconcious but quickly regained consciousness.  No active pain complaints.

## 2018-11-15 NOTE — CONSULT NOTE ADULT - PROBLEM SELECTOR RECOMMENDATION 9
-pt clinically stable with minimal active bleeding from the cervical os after evacuation of approximately 200cc clot from the vaginal vault  -CBC  -type and screen  -beta HCG  -TVUS to evaluate for retained products to determine if another D&C is needed    Discussed with Dr. Rivas and Dr. Jayjay Meadows MD PGY2

## 2018-11-15 NOTE — CONSULT NOTE ADULT - ASSESSMENT
33y  LMP in mid July status post D&C for a 10 week pregnancy 5 weeks ago who presents with vaginal bleeding since 18, in stable condition.

## 2018-11-15 NOTE — ED ADULT NURSE NOTE - NSIMPLEMENTINTERV_GEN_ALL_ED
Implemented All Fall Risk Interventions:  [place] to call system. Call bell, personal items and telephone within reach. Instruct patient to call for assistance. Room bathroom lighting operational. Non-slip footwear when patient is off stretcher. Physically safe environment: no spills, clutter or unnecessary equipment. Stretcher in lowest position, wheels locked, appropriate side rails in place. Provide visual cue, wrist band, yellow gown, etc. Monitor gait and stability. Monitor for mental status changes and reorient to person, place, and time. Review medications for side effects contributing to fall risk. Reinforce activity limits and safety measures with patient and family.

## 2018-11-15 NOTE — ED ADULT NURSE NOTE - OBJECTIVE STATEMENT
33 year old female , presents to the ED via EMS from outpatient OBGYN s/p D&C 2 weeks ago, still experiencing heavy bleeding. 33 year old female , presents to the ED via EMS from outpatient OBGYN s/p D&C 1 weeks ago, still experiencing heavy bleeding. No past medical history. Patient began bleeding large amount during pelvic exam and was sent from her OBGYN. office where she was found weak and pale. Initially had bleeding on Monday with clots that resolved the next day, bleeding started again today with clots so went to her OBGYN office.  Had D&C for missed AB 3 weeks ago, has been well since.  EMS states patient had syncopal episode en route from OBGYN with dry heaving.  Arrives to ED unconscious but quickly regained consciousness.  No active pain complaints. 33 year old female , presents to the ED via EMS from outpatient OB/GYN s/p D&C 1 weeks ago, still experiencing heavy bleeding. No past medical history. Patient had two prior D&C with different doctor. As per EMS, pt. began bleeding large amount during pelvic exam, OB called EMS to bring her into ED, where they found the pt. "weak and pale."  Pt. arrived to the ED while having a syncopal episode, lasting less than one minute, then became arousable. Upon assessment with patient, she reports she started bleeding 3 days ago with clots that resolved the next day, and that bleeding/passing clots began again today which prompted her to go to OB. Pt. denies fever, chills, diarrhea, dysuria, vaginal discharge. EMS placed 18g peripheral IV in right forearm and an 18g peripheral IV was placed in her left AC upon arrival, labs drawn and sent to lab. Patient's clothes were soaked in blood/clots. Patient was undressed, cleaned and changed. Side rails up with bed in lowest position for safety. blanket provided. Comfort and safety provided.

## 2018-11-15 NOTE — ED PROVIDER NOTE - NS ED ROS FT
Constitutional: no fever  Eyes: no conjunctivitis  Ears: no ear pain   Nose: no nasal congestion, Mouth/Throat: no throat pain, Neck: no stiffness  Cardiovascular: no chest pain  Chest: no cough  Gastrointestinal: no abdominal pain, no vomiting and diarrhea  MSK: no joint pain  : As noted in hpi   Skin: no rash  Neuro: As noted in hpi   All other review of systems negative except as noted in HPI

## 2018-11-15 NOTE — ED PROVIDER NOTE - ATTENDING CONTRIBUTION TO CARE
Attending MD Zarco:  I personally have seen and examined this patient.  Resident note reviewed and agree on plan of care and except where noted.  See HPI, PE, and MDM for details.

## 2018-11-15 NOTE — H&P ADULT - ASSESSMENT
33y  LMP in mid July status post D&C for a 10 week pregnancy 5 weeks ago who presents with vaginal bleeding and concern for possible aberrant uterine vessel on TVUS in stable condition.

## 2018-11-15 NOTE — CONSULT NOTE ADULT - SUBJECTIVE AND OBJECTIVE BOX
33y  LMP in mid July status post D&C for a 10 week pregnancy 5 weeks ago who presents with vaginal bleeding since 18. She notes that she was passing clots and then the bleeding stopped yesterday and resumed this morning. She went into Dr. Ro's office for heavy bleeding. Dr. Ro attempted to remove a clot, but the patient continued to have heavy bleeding, so she sent her to the ED. The patient noted feeling lightheaded upon arrival in the ED. She denies any fevers, chills, nausea, vomiting, chest pain, shortness of breath, constipation, diarrhea, dysuria, and vaginal discharge.        OB/GYN HISTORY: denies history of fibroids, STDs and abnormal paps, she has a history of ovarian cysts, 6x SABs, 3x MAB w/ D&Cs, 3x   PAST MEDICAL & SURGICAL HISTORY:  Missed  with fetal demise before 20 completed weeks of gestation  Acute gastric ulcer without hemorrhage or perforation: treated with medication, currently resolved  Cervical cerclage suture present: 2018  S/P D&C (status post dilation and curettage): multiple  History of episiotomy    Allergies    No Known Allergies    Intolerances      MEDICATIONS  (STANDING):    MEDICATIONS  (PRN):    SOCIAL HISTORY:  denies tobacco, alcohol, and illicit drug use       Vital Signs Last 24 Hrs  T(C): 36.4 (15 Nov 2018 11:47), Max: 36.4 (15 Nov 2018 11:47)  T(F): 97.6 (15 Nov 2018 11:47), Max: 97.6 (15 Nov 2018 11:47)  HR: 62 (15 Nov 2018 12:01) (60 - 80)  BP: 123/98 (15 Nov 2018 12:01) (106/57 - 132/76)  BP(mean): --  RR: 16 (15 Nov 2018 12:01) (15 - 20)  SpO2: 100% (15 Nov 2018 12:01) (96% - 100%)    PHYSICAL EXAM:      Constitutional: alert and oriented x 3      Respiratory: clear    Cardiovascular: regular rate and rhythm    Gastrointestinal: soft, non tender, no palpable masses    SSE: 200 cc of clot removed from the vagina, small clot removed from the cervix, light bleeding noted from the cervical os  SVE: os fingertip dilated, uterus of approximately 6 weeks size

## 2018-11-15 NOTE — H&P ADULT - PROBLEM SELECTOR PLAN 1
-admit to GYN  -IVF  -MR pelvis, MRa pelvis  -IR embolization of aberrant vessel  -pad counts  -CBC in the AM    Discussed with Dr. Jayjay Meadows MD PGY2

## 2018-11-16 ENCOUNTER — TRANSCRIPTION ENCOUNTER (OUTPATIENT)
Age: 33
End: 2018-11-16

## 2018-11-16 VITALS
SYSTOLIC BLOOD PRESSURE: 112 MMHG | HEART RATE: 69 BPM | RESPIRATION RATE: 18 BRPM | OXYGEN SATURATION: 98 % | DIASTOLIC BLOOD PRESSURE: 72 MMHG | TEMPERATURE: 98 F

## 2018-11-16 LAB
BASOPHILS # BLD AUTO: 0.1 K/UL — SIGNIFICANT CHANGE UP (ref 0–0.2)
BASOPHILS NFR BLD AUTO: 1.2 % — SIGNIFICANT CHANGE UP (ref 0–2)
EOSINOPHIL # BLD AUTO: 0.1 K/UL — SIGNIFICANT CHANGE UP (ref 0–0.5)
EOSINOPHIL NFR BLD AUTO: 1.8 % — SIGNIFICANT CHANGE UP (ref 0–6)
HCG SERPL-ACNC: 52 MIU/ML — HIGH
HCG SERPL-ACNC: POSITIVE MIU/ML — SIGNIFICANT CHANGE UP
HCT VFR BLD CALC: 34.7 % — SIGNIFICANT CHANGE UP (ref 34.5–45)
HGB BLD-MCNC: 11.7 G/DL — SIGNIFICANT CHANGE UP (ref 11.5–15.5)
LYMPHOCYTES # BLD AUTO: 1.9 K/UL — SIGNIFICANT CHANGE UP (ref 1–3.3)
LYMPHOCYTES # BLD AUTO: 34.2 % — SIGNIFICANT CHANGE UP (ref 13–44)
MCHC RBC-ENTMCNC: 30.9 PG — SIGNIFICANT CHANGE UP (ref 27–34)
MCHC RBC-ENTMCNC: 33.8 GM/DL — SIGNIFICANT CHANGE UP (ref 32–36)
MCV RBC AUTO: 91.3 FL — SIGNIFICANT CHANGE UP (ref 80–100)
MONOCYTES # BLD AUTO: 0.6 K/UL — SIGNIFICANT CHANGE UP (ref 0–0.9)
MONOCYTES NFR BLD AUTO: 10.7 % — SIGNIFICANT CHANGE UP (ref 2–14)
NEUTROPHILS # BLD AUTO: 2.9 K/UL — SIGNIFICANT CHANGE UP (ref 1.8–7.4)
NEUTROPHILS NFR BLD AUTO: 52.2 % — SIGNIFICANT CHANGE UP (ref 43–77)
PLATELET # BLD AUTO: 127 K/UL — LOW (ref 150–400)
RBC # BLD: 3.8 M/UL — SIGNIFICANT CHANGE UP (ref 3.8–5.2)
RBC # FLD: 13.1 % — SIGNIFICANT CHANGE UP (ref 10.3–14.5)
WBC # BLD: 5.5 K/UL — SIGNIFICANT CHANGE UP (ref 3.8–10.5)
WBC # FLD AUTO: 5.5 K/UL — SIGNIFICANT CHANGE UP (ref 3.8–10.5)

## 2018-11-16 PROCEDURE — P9016: CPT

## 2018-11-16 PROCEDURE — 84132 ASSAY OF SERUM POTASSIUM: CPT

## 2018-11-16 PROCEDURE — 36430 TRANSFUSION BLD/BLD COMPNT: CPT

## 2018-11-16 PROCEDURE — 93005 ELECTROCARDIOGRAM TRACING: CPT

## 2018-11-16 PROCEDURE — 86922 COMPATIBILITY TEST ANTIGLOB: CPT

## 2018-11-16 PROCEDURE — 99291 CRITICAL CARE FIRST HOUR: CPT | Mod: 25

## 2018-11-16 PROCEDURE — 72195 MRI PELVIS W/O DYE: CPT

## 2018-11-16 PROCEDURE — A9585: CPT

## 2018-11-16 PROCEDURE — 86900 BLOOD TYPING SEROLOGIC ABO: CPT

## 2018-11-16 PROCEDURE — 85730 THROMBOPLASTIN TIME PARTIAL: CPT

## 2018-11-16 PROCEDURE — 93975 VASCULAR STUDY: CPT

## 2018-11-16 PROCEDURE — C8918: CPT

## 2018-11-16 PROCEDURE — 84295 ASSAY OF SERUM SODIUM: CPT

## 2018-11-16 PROCEDURE — 82435 ASSAY OF BLOOD CHLORIDE: CPT

## 2018-11-16 PROCEDURE — 86901 BLOOD TYPING SEROLOGIC RH(D): CPT

## 2018-11-16 PROCEDURE — 85610 PROTHROMBIN TIME: CPT

## 2018-11-16 PROCEDURE — 86850 RBC ANTIBODY SCREEN: CPT

## 2018-11-16 PROCEDURE — 84702 CHORIONIC GONADOTROPIN TEST: CPT

## 2018-11-16 PROCEDURE — 85014 HEMATOCRIT: CPT

## 2018-11-16 PROCEDURE — 82330 ASSAY OF CALCIUM: CPT

## 2018-11-16 PROCEDURE — 83605 ASSAY OF LACTIC ACID: CPT

## 2018-11-16 PROCEDURE — 76817 TRANSVAGINAL US OBSTETRIC: CPT

## 2018-11-16 PROCEDURE — 85027 COMPLETE CBC AUTOMATED: CPT

## 2018-11-16 PROCEDURE — 82947 ASSAY GLUCOSE BLOOD QUANT: CPT

## 2018-11-16 PROCEDURE — 82803 BLOOD GASES ANY COMBINATION: CPT

## 2018-11-16 PROCEDURE — 80053 COMPREHEN METABOLIC PANEL: CPT

## 2018-11-16 NOTE — DISCHARGE NOTE ADULT - PLAN OF CARE
Well-Being Regular diet as tolerated, regular activity as tolerated. Call your provider if you experience fevers, chills, worsening abdominal pain, inability to urinate or worsening vaginal bleeding.  Follow up with your provider in 2 weeks.

## 2018-11-16 NOTE — DISCHARGE NOTE ADULT - CARE PLAN
Principal Discharge DX:	Aberrant artery  Goal:	Well-Being  Assessment and plan of treatment:	Regular diet as tolerated, regular activity as tolerated. Call your provider if you experience fevers, chills, worsening abdominal pain, inability to urinate or worsening vaginal bleeding.  Follow up with your provider in 2 weeks.

## 2018-11-16 NOTE — DISCHARGE NOTE ADULT - CARE PROVIDER_API CALL
Rosita Ro), Obstetrics and Gynecology  [address] [facility]  [address]  [place] [address]  Phone: [phone]  Fax: [phone]

## 2018-11-16 NOTE — PROGRESS NOTE ADULT - PROBLEM SELECTOR PLAN 1
Neuro: po pain meds  CV: Hemodynamically stable  Pulm: Saturating well on room air, encourage incentive spirometry  GI: NPO since midnight  : voiding spontaneously, minimal current active bleeding, follow up MR/MRa pelvis, for IR embolization today  Heme: c/w SCDs for DVT ppx  Dispo: Continue routine post-op care    Marlon Meadows MD PGY2

## 2018-11-16 NOTE — DISCHARGE NOTE ADULT - HOSPITAL COURSE
Patient presented to the hospital with excessive vaginal bleeding s/p D&C for missed  on 2018. Patient was also found to have dilated cervix. In the emergency room was transfused 2 units of blood after feeling dizzy and having a syncopal episode. The patient was evaluated with a transvaginal ultrasound and found to have an aberrant uterine vessel which was the source of her bleeding. She was treated with uterine artery embolization from interventional radiology. Since the procedure, the bleeing has significantly improved. The patient feels better, vitals have been stable, CBC has stabilized, ambulating without difficulty, tolerating PO, voiding spontaneously. Patient presented to the hospital with excessive vaginal bleeding s/p D&C for missed  on 2018. Patient was also found to have dilated cervix. In the emergency room was transfused 2 units of blood after feeling dizzy and having a syncopal episode. Her H/H at the time of the transfusion was 11.7/34.3. The patient was evaluated with a transvaginal ultrasound and found to have a possible aberrant uterine vessel an MRI and MRa pelvis was performed which was unable to re demonstrate the aberrant vessel. This morning, the patient feels better, her vaginal bleeding is minimal, her vitals have been stable, CBC has stabilized, ambulating without difficulty, tolerating PO, voiding spontaneously.

## 2018-11-16 NOTE — PROGRESS NOTE ADULT - SUBJECTIVE AND OBJECTIVE BOX
HEALTH ISSUES - PROBLEM Dx:  Vaginal bleeding: Vaginal bleeding  Missed  with fetal demise before 20 completed weeks of gestation: Missed  with fetal demise before 20 completed weeks of gestation      the patients MRA/MRI was normal. her bleeding has stopped. will disharge to home with very close follow up, as i do not feel that nondirected embolization is indicated in this patient who very much desires another pregnancy. will sernf off HC G and will followup.
INTERVAL HPI/OVERNIGHT EVENTS: Pt seen and examined at bedside.  Pt without complaints overnight.  Ambulating, passing flatus, tolerating regular diet and now NPO since midnight, pain controlled with analgesia, urinating spontaneously  She denies nausea/vomiting/fever/chills/chest pain/SOB/dizziness.    MEDICATIONS  (STANDING):  lactated ringers. 1000 milliLiter(s) (125 mL/Hr) IV Continuous <Continuous>    MEDICATIONS  (PRN):      12 point ROS negative except as outlined above    Vital Signs Last 24 Hrs  T(C): 36.7 (16 Nov 2018 06:25), Max: 37.4 (16 Nov 2018 01:00)  T(F): 98 (16 Nov 2018 06:25), Max: 99.4 (16 Nov 2018 01:00)  HR: 63 (16 Nov 2018 06:25) (60 - 89)  BP: 114/61 (16 Nov 2018 06:25) (102/55 - 132/76)  BP(mean): --  RR: 18 (16 Nov 2018 06:25) (15 - 20)  SpO2: 96% (16 Nov 2018 06:25) (96% - 100%)    I&O's Summary    15 Nov 2018 07:01  -  16 Nov 2018 07:00  --------------------------------------------------------  IN: 0 mL / OUT: 450 mL / NET: -450 mL          PHYSICAL EXAM:    GA: NAD, A+0 x 3  CV: RRR  Pulm: CTA BL  Abd: ( + ) BS, soft, nontender, nondistended, no rebound or guarding,   Uterus: Fundus midline; firm  : minimal vaginal bleeding  Extremities: no swelling or calf tenderness  Lines:      LABS:                          12.0   7.8   )-----------( CLUMPED    ( 15 Nov 2018 17:34 )             35.4   baso x      eos x      imm gran x      lymph x      mono x      poly x                            11.6   6.5   )-----------( 210      ( 15 Nov 2018 11:57 )             34.3   baso 1.2    eos 1.4    imm gran x      lymph 33.2   mono 8.6    poly 55.6         PT/INR - ( 15 Nov 2018 11:57 )   PT: 11.8 sec;   INR: 1.03 ratio         PTT - ( 15 Nov 2018 11:57 )  PTT:26.5 sec
the patient had a d&c 5 weeks ago for a missed  and has been having intermittent heavy vaginal bleeding which is worsening. she came to my office today . her cx was open and there was a bit of what looked like blood and debris in the uterus. multiple passes with pipelles were done, followed by profuse bleeding requiring an ambulance taking the patient to the ED and the patient getting a unit of blood after passing out. there is a suspicion of an aberrant artery/ pseudoaneurysm, and if there is it would be prudent to embolize it. will do further imaging to ascertain. if there is an aberrant artery would embolize. will admit and observe

## 2018-11-16 NOTE — DISCHARGE NOTE ADULT - PATIENT PORTAL LINK FT
You can access the [facility], offered by [facility], by registering with the following website: [email or web address]

## 2018-11-16 NOTE — DISCHARGE NOTE ADULT - MEDICATION SUMMARY - MEDICATIONS TO TAKE
I will START or STAY ON the medications listed below when I get home from the hospital:    Tylenol 325 mg oral tablet  -- 2 tab(s) by mouth every 4 hours, As Needed  -- Indication: For pain    ibuprofen 200 mg oral tablet  -- 2 tab(s) by mouth every 6 hours, As Needed  -- Indication: For pain

## 2018-12-03 ENCOUNTER — APPOINTMENT (OUTPATIENT)
Dept: INTERNAL MEDICINE | Facility: CLINIC | Age: 33
End: 2018-12-03

## 2019-03-17 ENCOUNTER — TRANSCRIPTION ENCOUNTER (OUTPATIENT)
Age: 34
End: 2019-03-17

## 2020-10-06 NOTE — ASU PREOP CHECKLIST - DENTURES
Last office visit with Dr. Vallecillo on 3/6/20    Current [facility] referral order is in effect from 9/11/20 to 9/11/21.      INR  Date  NEW:         6.4  10/6/20        Desired Range: 2.0 - 3.0  ________________________________________________________________  Previous:  3.9  9/10/20    Dose Decreased and held x 1 day.  Currently takes Warfarin 9 mg Tues/Thurs and 6 mg x 5 days  3mg tablets   ________________________________________________________________    10/6/20 NEW PLAN OF CARE :  1) Reinforcement of Education: Intoroducation to Warfarin (MOA, side effects, contraindications, inications)., Proper administration (day, time relationship to meals, missed doses, ect)., INR testing (importance and reason for repeated testing)., Signs/symptoms of bleeding and reporting to physician., Dietary intake of vitamin K (dietary sources, other sources (i.e vitamins)., Drug interactions ( importance of notifiying [facility] of medication changes - additions and discontinuations)., Anticoagulation identification (bracelet, necklace or wallet card)., What to do in the event of a future elective or emergency surgery or procedure., Effects of alcohol, tobacco, and exercise on the INR and effects of Warfarin., Travel preparations., Physical activity and the risk of falls., Medications, over-the-counter drugs, and supplements to avoid unless approved by the physician (e.g aspirin, non-steroidal anti-inflammatory drugs, herbals or homeopathic therapies,ect.)., Contacting the ACC if placed  on Antibiotic therapy., Contacting the ACC for Warfarin refills., Importance of compliance and the dangers of self-adjusting the dose. and  Actions to take when you are sick (colds, flu, diarrhea,ect.).    2) Sallie Palma is to HOLD Warfarin x 3 days, Tues, 10/6, Weds, 10/7 and Thurs, 10/8  3) 3 mg tabs  4) Next INR recheck in3 days, around Fri, 10/9/20, at the Lab  5) Vitamin K intake instructions: Have extra Vit K foods today and tomorrow  6) Tracking updated  (see the telephone encounter dated 10/6/20).       Detailed message left on patient's voicemail telling her to hold warfarin x 3 days and asking her to call back to update status and to confirm receipt of the new Plan of Care.  To Acc Support staff to await patient callback and/or to try to reach her today. no

## 2021-01-01 NOTE — PRE-ANESTHESIA EVALUATION ADULT - NSANTHNECKRD_ENT_A_CORE
Discussed with mother her plan for feeding. Reviewed the benefits of exclusive breast milk feeding during the hospital stay. Informed her of the risks of using formula to supplement in the first few days of life as well as the benefits of successful breast milk feeding; referred her to the Breastfeeding booklet about this information. She acknowledges understanding of information reviewed and states that it is her plan to both breast and formula feed her infant. Will support her choice and offer additional information as needed. Pt chooses to do both breast and bottle. Discussed effects of early supplementation on breastfeeding success; may decrease breastmilk production and supply, increase risk for pathological engorgement, baby may develop preference for faster flow from bottles vs breast, and baby's stomach can be stretched if larger volumes of formula are given. Pt will successfully establish breastfeeding by feeding in response to early feeding cues or wake every 3h, will obtain deep latch, and will keep log of feedings/output. Taught to BF at hunger cues and or q 2-3 hrs and to offer 10-20 drops of hand expressed colostrum at any non-feeds.       Breast Assessment  Left Breast: Large  Left Nipple: Flat, Intact  Right Breast: Large  Right Nipple: Everted, Intact  Breast- Feeding Assessment  Attends Breast-Feeding Classes: No  Breast-Feeding Experience: No(formula fed first child)  Breast Trauma/Surgery: No  Type/Quality: Good  Lactation Consultant Visits  Breast-Feedings: Good   Mother/Infant Observation  Mother Observation: Alignment, Breast comfortable, Close hold  Infant Observation: Latches nipple and aereolae, Lips flanged, lower, Lips flanged, upper, Opens mouth  LATCH Documentation  Latch: Grasps breast, tongue down, lips flanged, rhythmic sucking  Audible Swallowing: A few with stimulation  Type of Nipple: Flat  Comfort (Breast/Nipple): Soft/non-tender  Hold (Positioning): Full assist, teach one side, mother does other, staff holds  [facility] Score: 7  Pt instructed on use of devices for treatment of flat nipples: LatchAssist, baby latched well after using latch assist to bring out nipple. Hand Expression Education:  Mom taught how to manually hand express her colostrum. Emphasized the importance of providing infant with valuable colostrum as infant rests skin to skin at breast.  Aware to avoid extended periods of non-feeding. Aware to offer 10-20+ drops of colostrum every 2-3 hours until infant is latching and nursing effectively. Taught the rationale behind this low tech but highly effective evidence based practice. Reviewed breastfeeding basics:  How milk is made and normal  breastfeeding behaviors discussed. Supply and demand,  stomach size, early feeding cues, skin to skin bonding with comfortable positioning and baby led latch-on reviewed. How to identify signs of successful breastfeeding sessions reviewed; education on assymetrical latch, signs of effective latching vs shallow, in-effective latching, normal  feeding frequency and duration and expected infant output discussed. Normal course of breastfeeding discussed including the AAP's recommendation that children receive exclusive breast milk feedings for the first six months of life with breast milk feedings to continue through the first year of life and/or beyond as complimentary table foods are added. Breastfeeding Booklet and Warm line information provided with discussion. Discussed typical  weight loss and the importance of pediatrician appointment within 24-48 hours of discharge, at 2 weeks of life and normalcy of requesting pediatric weight checks as needed in between visits. Reviewed breastfeeding techniques and positions with mother until found a position she was most comfortable with.  Reminded mother of early feeding cues and that breast fed infants should be fed on demand without time restriction on the first breast until the infant seems satisfied. Then the second breast is offered. Advised mother to awaken  to feed if three hours have passed since baby last ate. Will continue to monitor mother's progress with breastfeeding and offer assistance at any time. No

## 2021-07-28 NOTE — H&P PST ADULT - NSANTHBMIRD_ENT_A_CORE
----- Message from Louis Chacko MD sent at 7/27/2021 10:25 PM CDT -----  Sodium has returned to normal at 143.   No

## 2021-11-22 NOTE — PATIENT PROFILE ADULT - BRADEN SENSORY
Caller: Sully Hardy    [id number] [place] [facility] [address] [facility] [phone] [facility] [phone] 4) no impairment

## 2022-07-14 NOTE — H&P PST ADULT - PT NEEDS ASSIST
Follow the instructions given and use the medication as directed.  Use ice packs twice a day.  Make sure you find the vaccination of the dog that bit you and return here tomorrow if the dog was on vaccinated for rabies.  Return immediately to the ER with any increasing pain, swelling, redness, discharge or any other concerns.   no

## 2023-03-30 ENCOUNTER — INPATIENT (INPATIENT)
Facility: HOSPITAL | Age: 38
LOS: 2 days | Discharge: HOME | DRG: 388 | End: 2023-04-02
Attending: STUDENT IN AN ORGANIZED HEALTH CARE EDUCATION/TRAINING PROGRAM | Admitting: STUDENT IN AN ORGANIZED HEALTH CARE EDUCATION/TRAINING PROGRAM
Payer: COMMERCIAL

## 2023-03-30 VITALS
RESPIRATION RATE: 20 BRPM | SYSTOLIC BLOOD PRESSURE: 124 MMHG | TEMPERATURE: 97 F | HEART RATE: 84 BPM | DIASTOLIC BLOOD PRESSURE: 72 MMHG | HEIGHT: 66 IN | WEIGHT: 149.91 LBS | OXYGEN SATURATION: 99 %

## 2023-03-30 DIAGNOSIS — Z98.890 OTHER SPECIFIED POSTPROCEDURAL STATES: Chronic | ICD-10-CM

## 2023-03-30 DIAGNOSIS — O34.30 MATERNAL CARE FOR CERVICAL INCOMPETENCE, UNSPECIFIED TRIMESTER: Chronic | ICD-10-CM

## 2023-03-30 LAB
ALBUMIN SERPL ELPH-MCNC: 4.6 G/DL — SIGNIFICANT CHANGE UP (ref 3.3–5)
ALP SERPL-CCNC: 44 U/L — SIGNIFICANT CHANGE UP (ref 40–120)
ALT FLD-CCNC: 23 U/L — SIGNIFICANT CHANGE UP (ref 10–45)
AST SERPL-CCNC: 18 U/L — SIGNIFICANT CHANGE UP (ref 10–40)
BASE EXCESS BLDV CALC-SCNC: 6.9 MMOL/L — HIGH (ref -2–3)
BASOPHILS # BLD AUTO: 0.05 K/UL — SIGNIFICANT CHANGE UP (ref 0–0.2)
BASOPHILS NFR BLD AUTO: 0.3 % — SIGNIFICANT CHANGE UP (ref 0–2)
BILIRUB SERPL-MCNC: 0.5 MG/DL — SIGNIFICANT CHANGE UP (ref 0.2–1.2)
BUN SERPL-MCNC: 14 MG/DL — SIGNIFICANT CHANGE UP (ref 7–23)
CA-I SERPL-SCNC: 1.21 MMOL/L — SIGNIFICANT CHANGE UP (ref 1.15–1.33)
CALCIUM SERPL-MCNC: 9.4 MG/DL — SIGNIFICANT CHANGE UP (ref 8.4–10.5)
CHLORIDE BLDV-SCNC: 105 MMOL/L — SIGNIFICANT CHANGE UP (ref 96–108)
CHLORIDE SERPL-SCNC: 103 MMOL/L — SIGNIFICANT CHANGE UP (ref 96–108)
CO2 BLDV-SCNC: 34 MMOL/L — HIGH (ref 22–26)
CO2 SERPL-SCNC: 28 MMOL/L — SIGNIFICANT CHANGE UP (ref 22–31)
CREAT SERPL-MCNC: 0.65 MG/DL — SIGNIFICANT CHANGE UP (ref 0.5–1.3)
EGFR: 115 ML/MIN/1.73M2 — SIGNIFICANT CHANGE UP
EOSINOPHIL # BLD AUTO: 0.03 K/UL — SIGNIFICANT CHANGE UP (ref 0–0.5)
EOSINOPHIL NFR BLD AUTO: 0.2 % — SIGNIFICANT CHANGE UP (ref 0–6)
FLUAV AG NPH QL: SIGNIFICANT CHANGE UP
FLUBV AG NPH QL: SIGNIFICANT CHANGE UP
GAS PNL BLDV: 139 MMOL/L — SIGNIFICANT CHANGE UP (ref 136–145)
GAS PNL BLDV: SIGNIFICANT CHANGE UP
GLUCOSE BLDV-MCNC: 141 MG/DL — HIGH (ref 70–99)
GLUCOSE SERPL-MCNC: 135 MG/DL — HIGH (ref 70–99)
HCO3 BLDV-SCNC: 32 MMOL/L — HIGH (ref 22–29)
HCT VFR BLD CALC: 40.5 % — SIGNIFICANT CHANGE UP (ref 34.5–45)
HCT VFR BLDA CALC: 42 % — SIGNIFICANT CHANGE UP (ref 34.5–46.5)
HGB BLD CALC-MCNC: 14.1 G/DL — SIGNIFICANT CHANGE UP (ref 11.7–16.1)
HGB BLD-MCNC: 13.6 G/DL — SIGNIFICANT CHANGE UP (ref 11.5–15.5)
IMM GRANULOCYTES NFR BLD AUTO: 0.8 % — SIGNIFICANT CHANGE UP (ref 0–0.9)
LACTATE BLDV-MCNC: 2.3 MMOL/L — HIGH (ref 0.5–2)
LIDOCAIN IGE QN: 1798 U/L — HIGH (ref 7–60)
LYMPHOCYTES # BLD AUTO: 15.8 % — SIGNIFICANT CHANGE UP (ref 13–44)
LYMPHOCYTES # BLD AUTO: 2.84 K/UL — SIGNIFICANT CHANGE UP (ref 1–3.3)
MCHC RBC-ENTMCNC: 30.4 PG — SIGNIFICANT CHANGE UP (ref 27–34)
MCHC RBC-ENTMCNC: 33.6 GM/DL — SIGNIFICANT CHANGE UP (ref 32–36)
MCV RBC AUTO: 90.4 FL — SIGNIFICANT CHANGE UP (ref 80–100)
MONOCYTES # BLD AUTO: 1.2 K/UL — HIGH (ref 0–0.9)
MONOCYTES NFR BLD AUTO: 6.7 % — SIGNIFICANT CHANGE UP (ref 2–14)
NEUTROPHILS # BLD AUTO: 13.7 K/UL — HIGH (ref 1.8–7.4)
NEUTROPHILS NFR BLD AUTO: 76.2 % — SIGNIFICANT CHANGE UP (ref 43–77)
NRBC # BLD: 0 /100 WBCS — SIGNIFICANT CHANGE UP (ref 0–0)
PCO2 BLDV: 49 MMHG — HIGH (ref 39–42)
PH BLDV: 7.43 — SIGNIFICANT CHANGE UP (ref 7.32–7.43)
PLATELET # BLD AUTO: 195 K/UL — SIGNIFICANT CHANGE UP (ref 150–400)
PO2 BLDV: 25 MMHG — SIGNIFICANT CHANGE UP (ref 25–45)
POTASSIUM BLDV-SCNC: 3.6 MMOL/L — SIGNIFICANT CHANGE UP (ref 3.5–5.1)
POTASSIUM SERPL-MCNC: 3.6 MMOL/L — SIGNIFICANT CHANGE UP (ref 3.5–5.3)
POTASSIUM SERPL-SCNC: 3.6 MMOL/L — SIGNIFICANT CHANGE UP (ref 3.5–5.3)
PROT SERPL-MCNC: 6.6 G/DL — SIGNIFICANT CHANGE UP (ref 6–8.3)
RBC # BLD: 4.48 M/UL — SIGNIFICANT CHANGE UP (ref 3.8–5.2)
RBC # FLD: 12.1 % — SIGNIFICANT CHANGE UP (ref 10.3–14.5)
RSV RNA NPH QL NAA+NON-PROBE: SIGNIFICANT CHANGE UP
SAO2 % BLDV: 35.6 % — LOW (ref 67–88)
SARS-COV-2 RNA SPEC QL NAA+PROBE: SIGNIFICANT CHANGE UP
SODIUM SERPL-SCNC: 142 MMOL/L — SIGNIFICANT CHANGE UP (ref 135–145)
WBC # BLD: 17.96 K/UL — HIGH (ref 3.8–10.5)
WBC # FLD AUTO: 17.96 K/UL — HIGH (ref 3.8–10.5)

## 2023-03-30 PROCEDURE — 71045 X-RAY EXAM CHEST 1 VIEW: CPT | Mod: 26

## 2023-03-30 PROCEDURE — 74177 CT ABD & PELVIS W/CONTRAST: CPT | Mod: 26,MA

## 2023-03-30 PROCEDURE — 99285 EMERGENCY DEPT VISIT HI MDM: CPT

## 2023-03-30 RX ORDER — MORPHINE SULFATE 50 MG/1
2 CAPSULE, EXTENDED RELEASE ORAL ONCE
Refills: 0 | Status: DISCONTINUED | OUTPATIENT
Start: 2023-03-30 | End: 2023-03-30

## 2023-03-30 RX ORDER — ONDANSETRON 8 MG/1
4 TABLET, FILM COATED ORAL ONCE
Refills: 0 | Status: COMPLETED | OUTPATIENT
Start: 2023-03-30 | End: 2023-03-30

## 2023-03-30 RX ORDER — CEFTRIAXONE 500 MG/1
1000 INJECTION, POWDER, FOR SOLUTION INTRAMUSCULAR; INTRAVENOUS ONCE
Refills: 0 | Status: COMPLETED | OUTPATIENT
Start: 2023-03-30 | End: 2023-03-30

## 2023-03-30 RX ORDER — SODIUM CHLORIDE 9 MG/ML
1000 INJECTION INTRAMUSCULAR; INTRAVENOUS; SUBCUTANEOUS ONCE
Refills: 0 | Status: COMPLETED | OUTPATIENT
Start: 2023-03-30 | End: 2023-03-30

## 2023-03-30 RX ORDER — KETOROLAC TROMETHAMINE 30 MG/ML
30 SYRINGE (ML) INJECTION ONCE
Refills: 0 | Status: DISCONTINUED | OUTPATIENT
Start: 2023-03-30 | End: 2023-03-30

## 2023-03-30 RX ORDER — MORPHINE SULFATE 50 MG/1
2 CAPSULE, EXTENDED RELEASE ORAL EVERY 6 HOURS
Refills: 0 | Status: DISCONTINUED | OUTPATIENT
Start: 2023-03-30 | End: 2023-03-30

## 2023-03-30 RX ORDER — MORPHINE SULFATE 50 MG/1
2 CAPSULE, EXTENDED RELEASE ORAL EVERY 6 HOURS
Refills: 0 | Status: DISCONTINUED | OUTPATIENT
Start: 2023-03-30 | End: 2023-03-31

## 2023-03-30 RX ORDER — ACETAMINOPHEN 500 MG
650 TABLET ORAL ONCE
Refills: 0 | Status: COMPLETED | OUTPATIENT
Start: 2023-03-30 | End: 2023-03-30

## 2023-03-30 RX ADMIN — MORPHINE SULFATE 2 MILLIGRAM(S): 50 CAPSULE, EXTENDED RELEASE ORAL at 22:42

## 2023-03-30 RX ADMIN — SODIUM CHLORIDE 1000 MILLILITER(S): 9 INJECTION INTRAMUSCULAR; INTRAVENOUS; SUBCUTANEOUS at 22:34

## 2023-03-30 RX ADMIN — SODIUM CHLORIDE 1000 MILLILITER(S): 9 INJECTION INTRAMUSCULAR; INTRAVENOUS; SUBCUTANEOUS at 22:48

## 2023-03-30 RX ADMIN — Medication 650 MILLIGRAM(S): at 22:23

## 2023-03-30 RX ADMIN — ONDANSETRON 4 MILLIGRAM(S): 8 TABLET, FILM COATED ORAL at 22:40

## 2023-03-30 RX ADMIN — MORPHINE SULFATE 2 MILLIGRAM(S): 50 CAPSULE, EXTENDED RELEASE ORAL at 22:49

## 2023-03-30 RX ADMIN — Medication 30 MILLIGRAM(S): at 22:40

## 2023-03-30 NOTE — ED PROVIDER NOTE - CARE PLAN
Principal Discharge DX:	Rt flank pain   1 Principal Discharge DX:	Acute pancreatitis  Secondary Diagnosis:	Acute pancreatitis  Secondary Diagnosis:	Rt flank pain

## 2023-03-30 NOTE — ED PROVIDER NOTE - NSICDXPASTMEDICALHX_GEN_ALL_CORE_FT
PAST MEDICAL HISTORY:  2019 novel coronavirus disease (COVID-19)     Acute gastric ulcer without hemorrhage or perforation treated with medication, currently resolved    COVID-19 vaccine series completed     Missed  with fetal demise before 20 completed weeks of gestation

## 2023-03-30 NOTE — ED PROVIDER NOTE - RAPID ASSESSMENT
Private Physician Luis Enrique Davis Neuro, Selene Myles/pcp  37y female pmh chronic low back pain. Coivd (mild symptoms) Gastric ulcer, No dm,hld,htn,cancer,cva,cad,travel,habits, Pt comes to ed c/o onset of midline low back pain yesterday. Subsequently shifted to rt flank and abd. Sharp stabbing. no dysiuria,hematiuria,nausea w/o vomiting. No prior similar pain. Pain was 10/10 tx w analgesia by ems and improved. No hx renal colic. PE WDWN female awake alert obvious pain.   Festus Gauthier MD, Facep     PT seen as Triag/Qdoc.  Full evaluation to be performed once patient is transferred to main ED  Festus Gauthier MD, Facep

## 2023-03-30 NOTE — ED ADULT NURSE NOTE - OBJECTIVE STATEMENT
37y F bibEMS to triage presents to the ED c/o R sided back/flank and lower abd pain. Pt reports onset of back pain last night and thought it was just a back strain. Pt reports worsening symptoms tonight that radiated to RLQ. Reports pain 10/10. Reports associated nasuea. Denies v/d, urinary symptoms, fevers, chills, headache, cp, sob, hematuria. Cardiac monitor placed. EKG done. Comfort and safety maintained.

## 2023-03-30 NOTE — ED PROVIDER NOTE - NSICDXPASTSURGICALHX_GEN_ALL_CORE_FT
PAST SURGICAL HISTORY:  Cervical cerclage suture present 5/2018    History of episiotomy     S/P D&C (status post dilation and curettage) multiple

## 2023-03-30 NOTE — ED PROVIDER NOTE - CONSTITUTIONAL, MLM
Well appearing, awake, alert, oriented to person, place, time/situation  in acute painful distress normal...

## 2023-03-30 NOTE — ED PROVIDER NOTE - OBJECTIVE STATEMENT
37 y old f with severe rt flank pain since yesterday and nausea ,no history of kidney stone ,no fever ,chills or hematuria ,LMP last week ,has IUD

## 2023-03-30 NOTE — ED PROVIDER NOTE - PROGRESS NOTE DETAILS
Bren Tim PGY1: Radiologist called that pt has SBO with transition point at proximal ileus. Pt denies any previous abdominal surgeries. Pt states her last BM was either this morning or yesterday. Pt not currently able to pass gas. Pt also found to have elevated lipase of 1700s concerning for pancreatitis. Gen surg consulted and will see pt for SBO. Bren Tim PGY1: Pt stated she started Celexa 1 week ago. Possible cause for pancreatitis. Bren Tim PGY1: Radiology called that SBO might be ileus because there is a gradual transition point. Gen surg attempted NGT but unsuccessful do to inability to pass through nasal passages and epistaxis. Minor epistaxis controlled with pressure. Pt also found to have kidney stone in right ureter in transit.

## 2023-03-30 NOTE — ED PROVIDER NOTE - NSCAREINITIATED _GEN_ER
Lip Wedge Excision Repair Text: Given the location of the defect and the proximity to free margins a full thickness wedge repair was deemed most appropriate.  Using a sterile surgical marker, the appropriate repair was drawn incorporating the defect and placing the expected incisions perpendicular to the vermilion border.  The vermilion border was also meticulously outlined to ensure appropriate reapproximation during the repair.  The area thus outlined was incised through and through with a #15 scalpel blade.  The muscularis and dermis were reaproximated with deep sutures following hemostasis. Care was taken to realign the vermilion border before proceeding with the superficial closure.  Once the vermilion was realigned the superfical and mucosal closure was finished. Bren Tim(Resident)

## 2023-03-30 NOTE — ED PROVIDER NOTE - CLINICAL SUMMARY MEDICAL DECISION MAKING FREE TEXT BOX
37 y old f with severe rt flank pain since yesterday ,concern for renal colic , less  likely pyelo ,no fever or chills ,will obtain blood work  ,ua ,iv hydration ,pain meds CT scan reassess ZR 37 y old f with severe rt flank pain since yesterday ,concern for renal colic , less  likely pyelo or gbd  no surgeries before less likely SBO  ,no fever or chills ,will obtain blood work   Lipase and vbg ,ua ,iv hydration ,pain meds CT scan reassess ZR

## 2023-03-30 NOTE — ED ADULT NURSE NOTE - SUICIDE SCREENING QUESTION 3
What to Expect After a Cystoscopy  For the next 24-48 hours, you may feel a mild burning when you urinate. This burning is normal and expected. Drink plenty of water to dilute the urine to help relieve the burning sensation. You may also see a small amount of blood in your urine after the procedure.    Unless you are already taking antibiotics, you may be given an antibiotic after the test to prevent infection.    Signs and Symptoms to Report  Call the [facility] at [phone] if you develop any of the following:  Fever of 101 degrees or higher  Chills or persistent bleeding  Inability to urinate  
No

## 2023-03-31 DIAGNOSIS — K85.90 ACUTE PANCREATITIS WITHOUT NECROSIS OR INFECTION, UNSPECIFIED: ICD-10-CM

## 2023-03-31 LAB
ALBUMIN SERPL ELPH-MCNC: 4 G/DL — SIGNIFICANT CHANGE UP (ref 3.3–5)
ALBUMIN SERPL ELPH-MCNC: 4.1 G/DL — SIGNIFICANT CHANGE UP (ref 3.3–5)
ALP SERPL-CCNC: 39 U/L — LOW (ref 40–120)
ALP SERPL-CCNC: 40 U/L — SIGNIFICANT CHANGE UP (ref 40–120)
ALT FLD-CCNC: 28 U/L — SIGNIFICANT CHANGE UP (ref 10–45)
ALT FLD-CCNC: 30 U/L — SIGNIFICANT CHANGE UP (ref 10–45)
ANION GAP SERPL CALC-SCNC: 10 MMOL/L — SIGNIFICANT CHANGE UP (ref 5–17)
APPEARANCE UR: CLEAR — SIGNIFICANT CHANGE UP
APTT BLD: 27.8 SEC — SIGNIFICANT CHANGE UP (ref 27.5–35.5)
AST SERPL-CCNC: 25 U/L — SIGNIFICANT CHANGE UP (ref 10–40)
AST SERPL-CCNC: 25 U/L — SIGNIFICANT CHANGE UP (ref 10–40)
BILIRUB DIRECT SERPL-MCNC: 0.1 MG/DL — SIGNIFICANT CHANGE UP (ref 0–0.3)
BILIRUB INDIRECT FLD-MCNC: 0.4 MG/DL — SIGNIFICANT CHANGE UP (ref 0.2–1)
BILIRUB SERPL-MCNC: 0.5 MG/DL — SIGNIFICANT CHANGE UP (ref 0.2–1.2)
BILIRUB SERPL-MCNC: 0.5 MG/DL — SIGNIFICANT CHANGE UP (ref 0.2–1.2)
BILIRUB UR-MCNC: NEGATIVE — SIGNIFICANT CHANGE UP
BLD GP AB SCN SERPL QL: NEGATIVE — SIGNIFICANT CHANGE UP
BUN SERPL-MCNC: 8 MG/DL — SIGNIFICANT CHANGE UP (ref 7–23)
BUN SERPL-MCNC: 8 MG/DL — SIGNIFICANT CHANGE UP (ref 7–23)
CALCIUM SERPL-MCNC: 8.1 MG/DL — LOW (ref 8.4–10.5)
CALCIUM SERPL-MCNC: 8.2 MG/DL — LOW (ref 8.4–10.5)
CHLORIDE SERPL-SCNC: 107 MMOL/L — SIGNIFICANT CHANGE UP (ref 96–108)
CHLORIDE SERPL-SCNC: 128 MMOL/L — HIGH (ref 96–108)
CO2 SERPL-SCNC: 23 MMOL/L — SIGNIFICANT CHANGE UP (ref 22–31)
CO2 SERPL-SCNC: 24 MMOL/L — SIGNIFICANT CHANGE UP (ref 22–31)
COLOR SPEC: SIGNIFICANT CHANGE UP
CREAT SERPL-MCNC: 0.54 MG/DL — SIGNIFICANT CHANGE UP (ref 0.5–1.3)
CREAT SERPL-MCNC: 0.55 MG/DL — SIGNIFICANT CHANGE UP (ref 0.5–1.3)
DIFF PNL FLD: NEGATIVE — SIGNIFICANT CHANGE UP
EGFR: 120 ML/MIN/1.73M2 — SIGNIFICANT CHANGE UP
EGFR: 120 ML/MIN/1.73M2 — SIGNIFICANT CHANGE UP
GLUCOSE SERPL-MCNC: 121 MG/DL — HIGH (ref 70–99)
GLUCOSE SERPL-MCNC: 129 MG/DL — HIGH (ref 70–99)
GLUCOSE UR QL: NEGATIVE — SIGNIFICANT CHANGE UP
HCG UR QL: NEGATIVE — SIGNIFICANT CHANGE UP
HCT VFR BLD CALC: 39.1 % — SIGNIFICANT CHANGE UP (ref 34.5–45)
HCT VFR BLD CALC: 40.7 % — SIGNIFICANT CHANGE UP (ref 34.5–45)
HGB BLD-MCNC: 12.6 G/DL — SIGNIFICANT CHANGE UP (ref 11.5–15.5)
HGB BLD-MCNC: 13 G/DL — SIGNIFICANT CHANGE UP (ref 11.5–15.5)
INR BLD: 1.21 RATIO — HIGH (ref 0.88–1.16)
KETONES UR-MCNC: NEGATIVE — SIGNIFICANT CHANGE UP
LEUKOCYTE ESTERASE UR-ACNC: NEGATIVE — SIGNIFICANT CHANGE UP
MCHC RBC-ENTMCNC: 29.9 PG — SIGNIFICANT CHANGE UP (ref 27–34)
MCHC RBC-ENTMCNC: 30 PG — SIGNIFICANT CHANGE UP (ref 27–34)
MCHC RBC-ENTMCNC: 31.9 GM/DL — LOW (ref 32–36)
MCHC RBC-ENTMCNC: 32.2 GM/DL — SIGNIFICANT CHANGE UP (ref 32–36)
MCV RBC AUTO: 92.7 FL — SIGNIFICANT CHANGE UP (ref 80–100)
MCV RBC AUTO: 93.8 FL — SIGNIFICANT CHANGE UP (ref 80–100)
NITRITE UR-MCNC: NEGATIVE — SIGNIFICANT CHANGE UP
NRBC # BLD: 0 /100 WBCS — SIGNIFICANT CHANGE UP (ref 0–0)
NRBC # BLD: 0 /100 WBCS — SIGNIFICANT CHANGE UP (ref 0–0)
PH UR: 8 — SIGNIFICANT CHANGE UP (ref 5–8)
PLATELET # BLD AUTO: 168 K/UL — SIGNIFICANT CHANGE UP (ref 150–400)
PLATELET # BLD AUTO: 176 K/UL — SIGNIFICANT CHANGE UP (ref 150–400)
POTASSIUM SERPL-MCNC: 3.5 MMOL/L — SIGNIFICANT CHANGE UP (ref 3.5–5.3)
POTASSIUM SERPL-MCNC: 3.8 MMOL/L — SIGNIFICANT CHANGE UP (ref 3.5–5.3)
POTASSIUM SERPL-SCNC: 3.5 MMOL/L — SIGNIFICANT CHANGE UP (ref 3.5–5.3)
POTASSIUM SERPL-SCNC: 3.8 MMOL/L — SIGNIFICANT CHANGE UP (ref 3.5–5.3)
PROT SERPL-MCNC: 6 G/DL — SIGNIFICANT CHANGE UP (ref 6–8.3)
PROT SERPL-MCNC: 6.1 G/DL — SIGNIFICANT CHANGE UP (ref 6–8.3)
PROT UR-MCNC: SIGNIFICANT CHANGE UP
PROTHROM AB SERPL-ACNC: 13.9 SEC — HIGH (ref 10.5–13.4)
RBC # BLD: 4.22 M/UL — SIGNIFICANT CHANGE UP (ref 3.8–5.2)
RBC # BLD: 4.34 M/UL — SIGNIFICANT CHANGE UP (ref 3.8–5.2)
RBC # FLD: 12.3 % — SIGNIFICANT CHANGE UP (ref 10.3–14.5)
RBC # FLD: 12.3 % — SIGNIFICANT CHANGE UP (ref 10.3–14.5)
RH IG SCN BLD-IMP: POSITIVE — SIGNIFICANT CHANGE UP
SODIUM SERPL-SCNC: 121 MMOL/L — LOW (ref 135–145)
SODIUM SERPL-SCNC: 141 MMOL/L — SIGNIFICANT CHANGE UP (ref 135–145)
SP GR SPEC: >1.05 (ref 1.01–1.02)
UROBILINOGEN FLD QL: NEGATIVE — SIGNIFICANT CHANGE UP
WBC # BLD: 10.88 K/UL — HIGH (ref 3.8–10.5)
WBC # BLD: 11.07 K/UL — HIGH (ref 3.8–10.5)
WBC # FLD AUTO: 10.88 K/UL — HIGH (ref 3.8–10.5)
WBC # FLD AUTO: 11.07 K/UL — HIGH (ref 3.8–10.5)

## 2023-03-31 PROCEDURE — 76705 ECHO EXAM OF ABDOMEN: CPT | Mod: 26

## 2023-03-31 PROCEDURE — 99233 SBSQ HOSP IP/OBS HIGH 50: CPT

## 2023-03-31 PROCEDURE — 99222 1ST HOSP IP/OBS MODERATE 55: CPT

## 2023-03-31 PROCEDURE — 71045 X-RAY EXAM CHEST 1 VIEW: CPT | Mod: 26

## 2023-03-31 RX ORDER — SPIRONOLACTONE 25 MG/1
1 TABLET, FILM COATED ORAL
Refills: 0 | DISCHARGE

## 2023-03-31 RX ORDER — ACETAMINOPHEN 500 MG
1000 TABLET ORAL ONCE
Refills: 0 | Status: COMPLETED | OUTPATIENT
Start: 2023-03-31 | End: 2023-03-31

## 2023-03-31 RX ORDER — PANTOPRAZOLE SODIUM 20 MG/1
40 TABLET, DELAYED RELEASE ORAL DAILY
Refills: 0 | Status: DISCONTINUED | OUTPATIENT
Start: 2023-03-31 | End: 2023-04-02

## 2023-03-31 RX ORDER — METHYLPHENIDATE HCL 5 MG
1 TABLET ORAL
Refills: 0 | DISCHARGE

## 2023-03-31 RX ORDER — SODIUM CHLORIDE 9 MG/ML
1000 INJECTION, SOLUTION INTRAVENOUS ONCE
Refills: 0 | Status: COMPLETED | OUTPATIENT
Start: 2023-03-31 | End: 2023-03-31

## 2023-03-31 RX ORDER — MORPHINE SULFATE 50 MG/1
4 CAPSULE, EXTENDED RELEASE ORAL ONCE
Refills: 0 | Status: DISCONTINUED | OUTPATIENT
Start: 2023-03-31 | End: 2023-03-31

## 2023-03-31 RX ORDER — ONDANSETRON 8 MG/1
4 TABLET, FILM COATED ORAL EVERY 6 HOURS
Refills: 0 | Status: DISCONTINUED | OUTPATIENT
Start: 2023-03-31 | End: 2023-04-02

## 2023-03-31 RX ORDER — DEXTROSE MONOHYDRATE, SODIUM CHLORIDE, AND POTASSIUM CHLORIDE 50; .745; 4.5 G/1000ML; G/1000ML; G/1000ML
1000 INJECTION, SOLUTION INTRAVENOUS
Refills: 0 | Status: DISCONTINUED | OUTPATIENT
Start: 2023-03-31 | End: 2023-04-02

## 2023-03-31 RX ORDER — HYDROMORPHONE HYDROCHLORIDE 2 MG/ML
0.5 INJECTION INTRAMUSCULAR; INTRAVENOUS; SUBCUTANEOUS EVERY 4 HOURS
Refills: 0 | Status: DISCONTINUED | OUTPATIENT
Start: 2023-03-31 | End: 2023-04-02

## 2023-03-31 RX ORDER — ENOXAPARIN SODIUM 100 MG/ML
40 INJECTION SUBCUTANEOUS EVERY 24 HOURS
Refills: 0 | Status: DISCONTINUED | OUTPATIENT
Start: 2023-03-31 | End: 2023-04-02

## 2023-03-31 RX ORDER — ONDANSETRON 8 MG/1
4 TABLET, FILM COATED ORAL ONCE
Refills: 0 | Status: COMPLETED | OUTPATIENT
Start: 2023-03-31 | End: 2023-03-31

## 2023-03-31 RX ORDER — ACETAMINOPHEN 500 MG
1000 TABLET ORAL EVERY 6 HOURS
Refills: 0 | Status: COMPLETED | OUTPATIENT
Start: 2023-03-31 | End: 2023-04-01

## 2023-03-31 RX ORDER — SODIUM CHLORIDE 9 MG/ML
1000 INJECTION, SOLUTION INTRAVENOUS
Refills: 0 | Status: DISCONTINUED | OUTPATIENT
Start: 2023-03-31 | End: 2023-03-31

## 2023-03-31 RX ADMIN — MORPHINE SULFATE 4 MILLIGRAM(S): 50 CAPSULE, EXTENDED RELEASE ORAL at 05:41

## 2023-03-31 RX ADMIN — ONDANSETRON 4 MILLIGRAM(S): 8 TABLET, FILM COATED ORAL at 16:04

## 2023-03-31 RX ADMIN — Medication 650 MILLIGRAM(S): at 05:41

## 2023-03-31 RX ADMIN — SODIUM CHLORIDE 1000 MILLILITER(S): 9 INJECTION, SOLUTION INTRAVENOUS at 02:42

## 2023-03-31 RX ADMIN — MORPHINE SULFATE 4 MILLIGRAM(S): 50 CAPSULE, EXTENDED RELEASE ORAL at 00:24

## 2023-03-31 RX ADMIN — Medication 30 MILLIGRAM(S): at 05:41

## 2023-03-31 RX ADMIN — MORPHINE SULFATE 2 MILLIGRAM(S): 50 CAPSULE, EXTENDED RELEASE ORAL at 05:41

## 2023-03-31 RX ADMIN — PANTOPRAZOLE SODIUM 40 MILLIGRAM(S): 20 TABLET, DELAYED RELEASE ORAL at 11:23

## 2023-03-31 RX ADMIN — DEXTROSE MONOHYDRATE, SODIUM CHLORIDE, AND POTASSIUM CHLORIDE 125 MILLILITER(S): 50; .745; 4.5 INJECTION, SOLUTION INTRAVENOUS at 10:18

## 2023-03-31 RX ADMIN — ONDANSETRON 4 MILLIGRAM(S): 8 TABLET, FILM COATED ORAL at 05:27

## 2023-03-31 RX ADMIN — Medication 400 MILLIGRAM(S): at 11:23

## 2023-03-31 RX ADMIN — Medication 400 MILLIGRAM(S): at 02:43

## 2023-03-31 RX ADMIN — DEXTROSE MONOHYDRATE, SODIUM CHLORIDE, AND POTASSIUM CHLORIDE 125 MILLILITER(S): 50; .745; 4.5 INJECTION, SOLUTION INTRAVENOUS at 18:36

## 2023-03-31 RX ADMIN — ENOXAPARIN SODIUM 40 MILLIGRAM(S): 100 INJECTION SUBCUTANEOUS at 18:41

## 2023-03-31 RX ADMIN — ONDANSETRON 4 MILLIGRAM(S): 8 TABLET, FILM COATED ORAL at 11:23

## 2023-03-31 RX ADMIN — SODIUM CHLORIDE 150 MILLILITER(S): 9 INJECTION, SOLUTION INTRAVENOUS at 06:53

## 2023-03-31 RX ADMIN — Medication 1000 MILLIGRAM(S): at 05:41

## 2023-03-31 RX ADMIN — Medication 400 MILLIGRAM(S): at 23:29

## 2023-03-31 RX ADMIN — Medication 1000 MILLIGRAM(S): at 14:56

## 2023-03-31 RX ADMIN — CEFTRIAXONE 100 MILLIGRAM(S): 500 INJECTION, POWDER, FOR SOLUTION INTRAMUSCULAR; INTRAVENOUS at 00:26

## 2023-03-31 RX ADMIN — MORPHINE SULFATE 4 MILLIGRAM(S): 50 CAPSULE, EXTENDED RELEASE ORAL at 05:23

## 2023-03-31 NOTE — CONSULT NOTE ADULT - SUBJECTIVE AND OBJECTIVE BOX
Patient is a 37y F with history of chronic back pain, gastric ulcer, and with prior abdominal surgical history who presented with back pain radiating to the epigastric region. Pt. also had nausea but no vomiting, fevers or chills. Patient had her last BM yesterday morning. On admission, VSS, labs showed elevated WBC 18 and lipase 1800, w/ lactic acidosis. CT A/P showed dilated stomach and proximal small bowel w/ gradual transition in the right pelvis concerning for possible partial SBO. Pt. admitted under surgery, has NGT in place and being given IV fluids. On my evaluation, patient reported she never had these symptoms before, started on citalopram one week ago and also takes Aldatone for acne. She drinks 1 glass of wine 1-2 times per week. Denied any hx of colon or pancreatic cancer. Prior to this admission, she had 3 formed stools per day for several years but never had intermittent abd pain.     REVIEW OF SYSTEMS:    CONSTITUTIONAL: No weakness, fevers or chills  EYES/ENT: No visual changes;  No vertigo or throat pain   NECK: No pain or stiffness  RESPIRATORY: No cough, wheezing, hemoptysis; No shortness of breath  CARDIOVASCULAR: No chest pain or palpitations  GASTROINTESTINAL: + nausea, vomiting   GENITOURINARY: No dysuria, frequency or hematuria  NEUROLOGICAL: No numbness or weakness  SKIN: No itching, burning, rashes, or lesions   All other review of systems is negative unless indicated above.    MEDICATIONS  (STANDING):  dextrose 5% + sodium chloride 0.45% with potassium chloride 20 mEq/L 1000 milliLiter(s) (125 mL/Hr) IV Continuous <Continuous>  enoxaparin Injectable 40 milliGRAM(s) SubCutaneous every 24 hours  pantoprazole  Injectable 40 milliGRAM(s) IV Push daily    MEDICATIONS  (PRN):  HYDROmorphone  Injectable 0.5 milliGRAM(s) IV Push every 4 hours PRN Severe Pain (7 - 10)  ondansetron Injectable 4 milliGRAM(s) IV Push every 6 hours PRN Nausea and/or Vomiting    Home Medications:  citalopram 10 mg oral tablet: 1 orally once a day Take 1/2 tab for 5 days, 1 tab for 5 days, then 1 1/2 tab for 5 days. Then start taking 20mg tab once per day (31 Mar 2023 11:11)  Concerta 18 mg/24 hr oral tablet, extended release: 1 orally once a day (31 Mar 2023 11:09)  ibuprofen 200 mg oral tablet: 2 tab(s) orally every 6 hours, As Needed (17 May 2018 14:39)  Medrol Dosepak 4 mg oral tablet: orally (31 Mar 2023 11:09)  spironolactone 50 mg oral tablet: 1 orally 3 times a day (31 Mar 2023 11:13)  Tylenol 325 mg oral tablet: 2 tab(s) orally every 4 hours, As Needed (17 May 2018 14:39)      Allergies    No Known Allergies    Intolerances    Vital Signs Last 24 Hrs  T(C): 36.3 (31 Mar 2023 15:49), Max: 36.8 (31 Mar 2023 06:40)  T(F): 97.4 (31 Mar 2023 15:49), Max: 98.2 (31 Mar 2023 06:40)  HR: 73 (31 Mar 2023 15:49) (62 - 85)  BP: 99/64 (31 Mar 2023 15:49) (99/64 - 124/72)  BP(mean): 75 (30 Mar 2023 23:00) (75 - 77)  RR: 18 (31 Mar 2023 15:49) (17 - 20)  SpO2: 99% (31 Mar 2023 15:49) (96% - 100%) Patient is a 37y F with history of chronic back pain, gastric ulcer, and with prior abdominal surgical history who presented with back pain radiating to the epigastric region. Pt. also had nausea but no vomiting, fevers or chills. Patient had her last BM yesterday morning. On admission, VSS, labs showed elevated WBC 18 and lipase 1800, w/ lactic acidosis. CT A/P showed dilated stomach and proximal small bowel w/ gradual transition in the right pelvis concerning for possible partial SBO. Pt. admitted under surgery, has NGT in place and being given IV fluids. On my evaluation, patient reported she never had these symptoms before, started on citalopram one week ago and also takes Aldatone for acne. She drinks 1 glass of wine 1-2 times per week. Denied any hx of colon or pancreatic cancer. Prior to this admission, she had 3 formed stools per day for several years but never had intermittent abd pain.     REVIEW OF SYSTEMS:    CONSTITUTIONAL: No weakness, fevers or chills  EYES/ENT: No visual changes;  No vertigo or throat pain   NECK: No pain or stiffness  RESPIRATORY: No cough, wheezing, hemoptysis; No shortness of breath  CARDIOVASCULAR: No chest pain or palpitations  GASTROINTESTINAL: + nausea, vomiting   GENITOURINARY: No dysuria, frequency or hematuria  NEUROLOGICAL: No numbness or weakness  SKIN: No itching, burning, rashes, or lesions   All other review of systems is negative unless indicated above.    MEDICATIONS  (STANDING):  dextrose 5% + sodium chloride 0.45% with potassium chloride 20 mEq/L 1000 milliLiter(s) (125 mL/Hr) IV Continuous <Continuous>  enoxaparin Injectable 40 milliGRAM(s) SubCutaneous every 24 hours  pantoprazole  Injectable 40 milliGRAM(s) IV Push daily    MEDICATIONS  (PRN):  HYDROmorphone  Injectable 0.5 milliGRAM(s) IV Push every 4 hours PRN Severe Pain (7 - 10)  ondansetron Injectable 4 milliGRAM(s) IV Push every 6 hours PRN Nausea and/or Vomiting    Home Medications:  citalopram 10 mg oral tablet: 1 orally once a day Take 1/2 tab for 5 days, 1 tab for 5 days, then 1 1/2 tab for 5 days. Then start taking 20mg tab once per day (31 Mar 2023 11:11)  Concerta 18 mg/24 hr oral tablet, extended release: 1 orally once a day (31 Mar 2023 11:09)  ibuprofen 200 mg oral tablet: 2 tab(s) orally every 6 hours, As Needed (17 May 2018 14:39)  Medrol Dosepak 4 mg oral tablet: orally (31 Mar 2023 11:09)  spironolactone 50 mg oral tablet: 1 orally 3 times a day (31 Mar 2023 11:13)  Tylenol 325 mg oral tablet: 2 tab(s) orally every 4 hours, As Needed (17 May 2018 14:39)      Allergies    No Known Allergies    Intolerances    Vital Signs Last 24 Hrs  T(C): 36.3 (31 Mar 2023 15:49), Max: 36.8 (31 Mar 2023 06:40)  T(F): 97.4 (31 Mar 2023 15:49), Max: 98.2 (31 Mar 2023 06:40)  HR: 73 (31 Mar 2023 15:49) (62 - 85)  BP: 99/64 (31 Mar 2023 15:49) (99/64 - 124/72)  BP(mean): 75 (30 Mar 2023 23:00) (75 - 77)  RR: 18 (31 Mar 2023 15:49) (17 - 20)  SpO2: 99% (31 Mar 2023 15:49) (96% - 100%)    Appearance: awake, discomfort	  HEENT:   Normal oral mucosa, PERRL, EOMI	  Lymphatic: No lymphadenopathy , no edema  Cardiovascular: Normal S1 S2, No JVD, No murmurs , Peripheral pulses palpable 2+ bilaterally  Respiratory: Lungs clear to auscultation, normal effort 	  Gastrointestinal:  Soft, Non-tender, + BS	  Skin: No rashes, No ecchymoses, No cyanosis, warm to touch  Musculoskeletal: Normal range of motion, normal strength  Psychiatry:  Mood & affect appropriate  Ext: No edema                          13.0   10.88 )-----------( 168      ( 31 Mar 2023 12:08 )             40.7               03-31    141  |  107  |  8   ----------------------------<  121<H>  3.8   |  24  |  0.55    Ca    8.2<L>      31 Mar 2023 12:08    TPro  6.1  /  Alb  4.1  /  TBili  0.5  /  DBili  0.1  /  AST  25  /  ALT  30  /  AlkPhos  40  03-31    PT/INR - ( 31 Mar 2023 01:13 )   PT: 13.9 sec;   INR: 1.21 ratio         PTT - ( 31 Mar 2023 01:13 )  PTT:27.8 sec                   Urinalysis Basic - ( 31 Mar 2023 01:31 )    Color: Light Yellow / Appearance: Clear / SG: >1.050 / pH: x  Gluc: x / Ketone: Negative  / Bili: Negative / Urobili: Negative   Blood: x / Protein: Trace / Nitrite: Negative   Leuk Esterase: Negative / RBC: x / WBC x   Sq Epi: x / Non Sq Epi: x / Bacteria: x

## 2023-03-31 NOTE — CONSULT NOTE ADULT - SUBJECTIVE AND OBJECTIVE BOX
37y F with history of chronic back pain, gastric ulcer, no prior abdominal surgical history presenting with back pain radiating to the epigastrium associated with nausea since yesterday. Passing flatus and had a bowel movement. Pt denies any CP, SOB, vomiting, diarrhea, or chills.    In the ED AVSS, WBC 18, Lipase 1798, lactate 2.3.     Surgery consulted for CT findings c/w SBO vs ileus. No discrete transition point in  CT scan but gradual decrease of the caliber noted.    NGT placement attempted by senior, patient immediately started having nasal bleeding that stopped with application of pressure. Patient states she has narrow nostrils, with septum deformity and she previously had ENT placing pediatric scope to examine nasal mucosa.    PAST MEDICAL & SURGICAL HISTORY:  Acute gastric ulcer without hemorrhage or perforation  treated with medication, currently resolved      Missed  with fetal demise before 20 completed weeks of gestation       novel coronavirus disease (COVID-19)      COVID-19 vaccine series completed      History of episiotomy      S/P D&C (status post dilation and curettage)  multiple      Cervical cerclage suture present  2018      Vital Signs Last 24 Hrs  T(C): 36.3 (31 Mar 2023 02:31), Max: 36.3 (31 Mar 2023 02:31)  T(F): 97.4 (31 Mar 2023 02:31), Max: 97.4 (31 Mar 2023 02:31)  HR: 62 (31 Mar 2023 02:31) (62 - 84)  BP: 103/60 (31 Mar 2023 02:31) (102/69 - 124/72)  BP(mean): 75 (30 Mar 2023 23:00) (75 - 77)  RR: 18 (31 Mar 2023 02:31) (17 - 20)  SpO2: 96% (31 Mar 2023 02:31) (96% - 100%)    Parameters below as of 31 Mar 2023 02:31  Patient On (Oxygen Delivery Method): room air    Physical exam  General: NAD  Cardiac: Regular rate  Respiratory: Nonlabored breathing  Abdominal Exam: soft, nondistended, mild tenderness on the epigastric area, no rebound, no guarding  Muskuloskeletal: Moves all 4 extremities spontaneously  Neurological: Alert and oriented, no gross focal deficits, no motor or sensory deficits.  Psych: AOX3     LABS:  cret                        13.6   17.96 )-----------( 195      ( 30 Mar 2023 22:27 )             40.5         142  |  103  |  14  ----------------------------<  135<H>  3.6   |  28  |  0.65    Ca    9.4      30 Mar 2023 22:27    TPro  6.6  /  Alb  4.6  /  TBili  0.5  /  DBili  x   /  AST  18  /  ALT  23  /  AlkPhos  44      PT/INR - ( 31 Mar 2023 01:13 )   PT: 13.9 sec;   INR: 1.21 ratio         PTT - ( 31 Mar 2023 01:13 )  PTT:27.8 sec          ACC: [id number] EXAM:  CT ABDOMEN AND PELVIS IC   ORDERED BY: OLYA NERI     PROCEDURE DATE:  2023          INTERPRETATION:  CLINICAL INFORMATION: Abdominal pain. Severe right flank   pain, nausea. Elevated lipase.    COMPARISON: CT abdomen pelvis 2011.    CONTRAST/COMPLICATIONS:  IV Contrast: Omnipaque 350  90 cc administered   10 cc discarded  Oral Contrast: NONE  Complications: None reported at time of study completion    PROCEDURE:  CT of the Abdomen and Pelvis was performed.  Sagittal and coronal reformats were performed.    FINDINGS:  LOWER CHEST: Within normal limits.    LIVER: Within normal limits.  BILE DUCTS: Mild intrahepatic and extrahepatic biliary duct dilatation   noted.  GALLBLADDER: Distended. No evidence ofgallstone.  SPLEEN: Within normal limits.  PANCREAS: Pancreatic duct is prominent in size, though not overly   dilated. This appears increased compared to previous exam.  ADRENALS: Within normal limits.  KIDNEYS/URETERS: Within normal limits. No urinary tract obstruction or   obstructing stone identified.    BLADDER: Within normal limits.  REPRODUCTIVE ORGANS: Intrauterine device. Retroverted uterus. 3.4 cm left   ovarian cyst.    BOWEL: The stomach and proximal small bowel are dilated and fluid-filled   measuring up to 4 cm in diameter. Feculent small bowel contents are seen   within the right mid to lower abdomen. There is no abrupt transition of   caliber, though rather a more gradual transition of caliber within the   right pelvis. There is associated wall thickening. Distal small bowel   loops are decompressed. Overall findings are suspicious for high-grade   early or partial small bowel obstruction. There is associated mesenteric   edema. This may suggest vascular compromise. The appendix is normal.  PERITONEUM: No ascites or free air.  VESSELS: Within normal limits.  RETROPERITONEUM/LYMPH NODES: No lymphadenopathy.  ABDOMINAL WALL: Within normal limits.  BONES: Within normal limits.    IMPRESSION:  Dilated stomach and proximal small bowel, without abrupt transition   though with distal small bowel loops appear decompressed. Overall   findings are compatible with high-grade early or partial small bowel   obstruction. Possibility of developing ileus is considered less likely   given the degree of caliber change.    Mild pancreatic biliary ductal prominence, nonspecific in the setting of   high-grade bowel obstruction. Endoscopy or MRCP can be obtained for   further delineation, if deemed clinically indicated.    These findings were discussed with, and acknowledged by, Dr. Roberts at    3/31/2023 12:38 AM  by Dr. Pedro Ruffin.    --- End of Report ---          PEDRO RUFFIN MD; Resident Radiologist  This document has been electronically signed.   MP GUNTER M.D., Attending Radiologist  This document has been electronically signed. Mar 31 2023  2:15AM   37y F with history of chronic back pain, gastric ulcer, no prior abdominal surgical history presenting with back pain radiating to the epigastrium associated with nausea since yesterday. Passing flatus and had a bowel movement. Pt denies any CP, SOB, vomiting, diarrhea, or chills.    In the ED AVSS, WBC 18, Lipase 1798, lactate 2.3.     Surgery consulted for CT findings c/w SBO vs ileus. No discrete transition point in  CT scan but gradual decrease of the caliber noted.    NGT placement attempted by senior, patient immediately started having nasal bleeding that stopped with application of pressure. Patient then states she has narrow nostrils, with septum deformity and she previously had ENT placing pediatric scope to examine nasal mucosa.    PAST MEDICAL & SURGICAL HISTORY:  Acute gastric ulcer without hemorrhage or perforation  treated with medication, currently resolved      Missed  with fetal demise before 20 completed weeks of gestation       novel coronavirus disease (COVID-19)      COVID-19 vaccine series completed      History of episiotomy      S/P D&C (status post dilation and curettage)  multiple      Cervical cerclage suture present  2018      Vital Signs Last 24 Hrs  T(C): 36.3 (31 Mar 2023 02:31), Max: 36.3 (31 Mar 2023 02:31)  T(F): 97.4 (31 Mar 2023 02:31), Max: 97.4 (31 Mar 2023 02:31)  HR: 62 (31 Mar 2023 02:31) (62 - 84)  BP: 103/60 (31 Mar 2023 02:31) (102/69 - 124/72)  BP(mean): 75 (30 Mar 2023 23:00) (75 - 77)  RR: 18 (31 Mar 2023 02:31) (17 - 20)  SpO2: 96% (31 Mar 2023 02:31) (96% - 100%)    Parameters below as of 31 Mar 2023 02:31  Patient On (Oxygen Delivery Method): room air    Physical exam  General: NAD  Cardiac: Regular rate  Respiratory: Nonlabored breathing  Abdominal Exam: soft, nondistended, mild tenderness on the epigastric area, no rebound, no guarding  Muskuloskeletal: Moves all 4 extremities spontaneously  Neurological: Alert and oriented, no gross focal deficits, no motor or sensory deficits.  Psych: AOX3     LABS:  cret                        13.6   17.96 )-----------( 195      ( 30 Mar 2023 22:27 )             40.5         142  |  103  |  14  ----------------------------<  135<H>  3.6   |  28  |  0.65    Ca    9.4      30 Mar 2023 22:27    TPro  6.6  /  Alb  4.6  /  TBili  0.5  /  DBili  x   /  AST  18  /  ALT  23  /  AlkPhos  44      PT/INR - ( 31 Mar 2023 01:13 )   PT: 13.9 sec;   INR: 1.21 ratio         PTT - ( 31 Mar 2023 01:13 )  PTT:27.8 sec          ACC: [id number] EXAM:  CT ABDOMEN AND PELVIS IC   ORDERED BY: OLYA NERI     PROCEDURE DATE:  2023          INTERPRETATION:  CLINICAL INFORMATION: Abdominal pain. Severe right flank   pain, nausea. Elevated lipase.    COMPARISON: CT abdomen pelvis 2011.    CONTRAST/COMPLICATIONS:  IV Contrast: Omnipaque 350  90 cc administered   10 cc discarded  Oral Contrast: NONE  Complications: None reported at time of study completion    PROCEDURE:  CT of the Abdomen and Pelvis was performed.  Sagittal and coronal reformats were performed.    FINDINGS:  LOWER CHEST: Within normal limits.    LIVER: Within normal limits.  BILE DUCTS: Mild intrahepatic and extrahepatic biliary duct dilatation   noted.  GALLBLADDER: Distended. No evidence ofgallstone.  SPLEEN: Within normal limits.  PANCREAS: Pancreatic duct is prominent in size, though not overly   dilated. This appears increased compared to previous exam.  ADRENALS: Within normal limits.  KIDNEYS/URETERS: Within normal limits. No urinary tract obstruction or   obstructing stone identified.    BLADDER: Within normal limits.  REPRODUCTIVE ORGANS: Intrauterine device. Retroverted uterus. 3.4 cm left   ovarian cyst.    BOWEL: The stomach and proximal small bowel are dilated and fluid-filled   measuring up to 4 cm in diameter. Feculent small bowel contents are seen   within the right mid to lower abdomen. There is no abrupt transition of   caliber, though rather a more gradual transition of caliber within the   right pelvis. There is associated wall thickening. Distal small bowel   loops are decompressed. Overall findings are suspicious for high-grade   early or partial small bowel obstruction. There is associated mesenteric   edema. This may suggest vascular compromise. The appendix is normal.  PERITONEUM: No ascites or free air.  VESSELS: Within normal limits.  RETROPERITONEUM/LYMPH NODES: No lymphadenopathy.  ABDOMINAL WALL: Within normal limits.  BONES: Within normal limits.    IMPRESSION:  Dilated stomach and proximal small bowel, without abrupt transition   though with distal small bowel loops appear decompressed. Overall   findings are compatible with high-grade early or partial small bowel   obstruction. Possibility of developing ileus is considered less likely   given the degree of caliber change.    Mild pancreatic biliary ductal prominence, nonspecific in the setting of   high-grade bowel obstruction. Endoscopy or MRCP can be obtained for   further delineation, if deemed clinically indicated.    These findings were discussed with, and acknowledged by, Dr. Roberts at    3/31/2023 12:38 AM  by Dr. Pedro Ruffin.    --- End of Report ---          PEDRO RUFFIN MD; Resident Radiologist  This document has been electronically signed.   MP GUNTER M.D., Attending Radiologist  This document has been electronically signed. Mar 31 2023  2:15AM

## 2023-03-31 NOTE — PATIENT PROFILE ADULT - FALL HARM RISK - UNIVERSAL INTERVENTIONS
Bed in lowest position, wheels locked, appropriate side rails in place/Call bell, personal items and telephone in reach/Instruct patient to call for assistance before getting out of bed or chair/Non-slip footwear when patient is out of bed/[place] to call system/Physically safe environment - no spills, clutter or unnecessary equipment/Purposeful Proactive Rounding/Room/bathroom lighting operational, light cord in reach

## 2023-03-31 NOTE — CONSULT NOTE ADULT - SUBJECTIVE AND OBJECTIVE BOX
HPI:    Ms. Hernandez is a 37y F with history of chronic back pain, gastric ulcer, and with prior abdominal surgical history who presented with back pain radiating to the epigastric region. Pt. also had nausea but no vomiting, fevers or chills. Patient had her last BM yesterday morning. On admission, VSS, labs showed elevated WBC 18 and lipase 1800, w/ lactic acidosis. CT A/P showed dilated stomach and proximal small bowel w/ gradual transition in the right pelvis concerning for possible partial SBO. Pt. admitted under surgery, has NGT in place and being given IV fluids. On my evaluation, patient reported she never had these symptoms before, started on citalopram one week ago and also takes Aldatone for acne. She drinks 1 glass of wine 1-2 times per week. Denied any hx of colon or pancreatic cancer. Prior to this admission, she had 3 formed stools per day for several years but never had intermittent abd pain. GI consulted for pancreatitis.     Allergies:  No Known Allergies    Home Medications:  · 	Tylenol 325 mg oral tablet: 2 tab(s) orally every 4 hours, As Needed  · 	ibuprofen 200 mg oral tablet: 2 tab(s) orally every 6 hours, As Needed    Hospital Medications:  dextrose 5% + sodium chloride 0.45% with potassium chloride 20 mEq/L 1000 milliLiter(s) IV Continuous <Continuous>  enoxaparin Injectable 40 milliGRAM(s) SubCutaneous every 24 hours  HYDROmorphone  Injectable 0.5 milliGRAM(s) IV Push every 4 hours PRN  ondansetron Injectable 4 milliGRAM(s) IV Push every 6 hours PRN  pantoprazole  Injectable 40 milliGRAM(s) IV Push daily      PMHX/PSHX:  Acute gastric ulcer without hemorrhage or perforation    Missed  with fetal demise before 20 completed weeks of gestation    2019 novel coronavirus disease (COVID-19)    COVID-19 vaccine series completed    History of episiotomy    S/P D&C (status post dilation and curettage)    Cervical cerclage suture present in second trimester    Cervical cerclage suture present    Family history:  No colon or pancreatic cancer.     Social History:   Tob: Denies  EtOH: Drinks 1 glass of wine, about 2-3 times per week.   Illicit Drugs: Denies    ROS:     General:  No wt loss, fevers, chills, night sweats, fatigue  Eyes:  Good vision, no reported pain  ENT:  No sore throat, pain, runny nose, dysphagia  CV:  No pain, palpitations, hypo/hypertension  Pulm:  No dyspnea, cough, tachypnea, wheezing  GI:  see HPI  :  No pain, bleeding, incontinence, nocturia  Muscle:  No pain, weakness  Neuro:  No weakness, tingling, memory problems  Psych:  No fatigue, insomnia, mood problems, depression  Endocrine:  No polyuria, polydipsia, cold/heat intolerance  Heme:  No petechiae, ecchymosis, easy bruisability  Skin:  No rash, tattoos, scars, edema    PHYSICAL EXAM:     GENERAL:  No acute distress, young female, lying in bed.   HEENT:  NCAT, no scleral icterus, has NGT in place.   CHEST:  no respiratory distress  HEART:  Regular rate and rhythm  ABDOMEN:  Soft, non-tender, non-distended, no masses  EXTREMITIES: No LE edema  SKIN:  No rash/erythema/ecchymoses/petechiae/wounds/abscess/warm/dry  NEURO:  Alert and oriented x 3, no tremors.     Vital Signs:  Vital Signs Last 24 Hrs  T(C): 36.4 (31 Mar 2023 13:15), Max: 36.8 (31 Mar 2023 06:40)  T(F): 97.5 (31 Mar 2023 13:15), Max: 98.2 (31 Mar 2023 06:40)  HR: 62 (31 Mar 2023 13:15) (62 - 85)  BP: 105/67 (31 Mar 2023 13:15) (102/69 - 124/72)  BP(mean): 75 (30 Mar 2023 23:00) (75 - 77)  RR: 18 (31 Mar 2023 13:15) (17 - 20)  SpO2: 97% (31 Mar 2023 13:15) (96% - 100%)    Parameters below as of 31 Mar 2023 13:15  Patient On (Oxygen Delivery Method): room air      Daily Height in cm: 167.64 (30 Mar 2023 21:43)    Daily     LABS:                        13.0   10.88 )-----------( 168      ( 31 Mar 2023 12:08 )             40.7     Mean Cell Volume: 93.8 fl (- @ 12:08)        141  |  107  |  8   ----------------------------<  121<H>  3.8   |  24  |  0.55    Ca    8.2<L>      31 Mar 2023 12:08    TPro  6.1  /  Alb  4.1  /  TBili  0.5  /  DBili  0.1  /  AST  25  /  ALT  30  /  AlkPhos  40  03-31    LIVER FUNCTIONS - ( 31 Mar 2023 12:08 )  Alb: 4.1 g/dL / Pro: 6.1 g/dL / ALK PHOS: 40 U/L / ALT: 30 U/L / AST: 25 U/L / GGT: x           PT/INR - ( 31 Mar 2023 01:13 )   PT: 13.9 sec;   INR: 1.21 ratio         PTT - ( 31 Mar 2023 01:13 )  PTT:27.8 sec  Urinalysis Basic - ( 31 Mar 2023 01:31 )    Color: Light Yellow / Appearance: Clear / SG: >1.050 / pH: x  Gluc: x / Ketone: Negative  / Bili: Negative / Urobili: Negative   Blood: x / Protein: Trace / Nitrite: Negative   Leuk Esterase: Negative / RBC: x / WBC x   Sq Epi: x / Non Sq Epi: x / Bacteria: x      Amylase Serum--      Lipase [id number]       Ammonia--                          13.0   10.88 )-----------( 168      ( 31 Mar 2023 12:08 )             40.7                         12.6   11.07 )-----------( 176      ( 31 Mar 2023 06:15 )             39.1                         13.6   17.96 )-----------( 195      ( 30 Mar 2023 22:27 )             40.5       Imaging:      ACC: [id number] EXAM:  CT ABDOMEN AND PELVIS IC   ORDERED BY: OLYA NERI     PROCEDURE DATE:  2023          INTERPRETATION:  CLINICAL INFORMATION: Abdominal pain. Severe right flank   pain, nausea. Elevated lipase.    COMPARISON: CT abdomen pelvis 2011.    CONTRAST/COMPLICATIONS:  IV Contrast: Omnipaque 350  90 cc administered   10 cc discarded  Oral Contrast: NONE  Complications: None reported at time of study completion    PROCEDURE:  CT of the Abdomen and Pelvis was performed.  Sagittal and coronal reformats were performed.    FINDINGS:  LOWER CHEST: Within normal limits.    LIVER: Within normal limits.  BILE DUCTS: Mild intrahepatic and extrahepatic biliary duct dilatation   noted.  GALLBLADDER: Distended. No evidence ofgallstone.  SPLEEN: Within normal limits.  PANCREAS: Pancreatic duct is prominent in size, though not overly   dilated. This appears increased compared to previous exam.  ADRENALS: Within normal limits.  KIDNEYS/URETERS: Within normal limits. No urinary tract obstruction or   obstructing stone identified.    BLADDER: Within normal limits.  REPRODUCTIVE ORGANS: Intrauterine device. Retroverted uterus. 3.4 cm left   ovarian cyst.    BOWEL: The stomach and proximal small bowel are dilated and fluid-filled   measuring up to 4 cm in diameter. Feculent small bowel contents are seen   within the right mid to lower abdomen. There is no abrupt transition of   caliber, though rather a more gradual transition of caliber within the   right pelvis. There is associated wall thickening. Distal small bowel   loops are decompressed. Overall findings are suspicious for high-grade   early or partial small bowel obstruction. There is associated mesenteric   edema. This may suggest vascular compromise. The appendix is normal.  PERITONEUM: No ascites or free air.  VESSELS: Within normal limits.  RETROPERITONEUM/LYMPH NODES: No lymphadenopathy.  ABDOMINAL WALL: Within normal limits.  BONES: Within normal limits.    IMPRESSION:  Dilated stomach and proximal small bowel, without abrupt transition   though with distal small bowel loops appear decompressed. Overall   findings are compatible with high-grade early or partial small bowel   obstruction. Possibility of developing ileus is considered less likely   given the degree of caliber change.    Mild pancreatic biliary ductal prominence, nonspecific in the setting of   high-grade bowel obstruction. Endoscopy or MRCP can be obtained for   further delineation, if deemed clinically indicated.    ACC: [id number] EXAM:  US ABDOMEN RT UPR QUADRANT   ORDERED BY: FRANCIS REYNOLDS     PROCEDURE DATE:  2023          INTERPRETATION:  CLINICAL INFORMATION: Right upper quadrant pain for two   days. Assess for cholelithiasis.    COMPARISON: CTabdomen and pelvis 3/30/62387.    TECHNIQUE: Sonography of the right upper quadrant.    FINDINGS:  Liver: Within normal limits.  Bile ducts: The common bile duct is mildly dilated to 8 mm. No   intrahepatic duct dilation is visualized.  Gallbladder: Mildly distended. No cholelithiasis is visualized. No wall   thickening or pericholecystic edema. No focal pain was elicited over the   gallbladder during the exam. However, evaluation for sonographic Navarro   sign may be limited in the setting of recent pain medication   administration.  Pancreas: Visualized portions are within normal limits.  Right kidney: 8.9 cm. No hydronephrosis.  Ascites: None.  IVC: Visualized portions are within normal limits.    IMPRESSION:  Mildly distended gallbladder without wall thickening or pericholecystic   edema. No cholelithiasis.    Mildly dilated common bile duct to 8 mm. Suggest correlation with liver   function tests. Consider further evaluation with abdominal MRI/MRCP.

## 2023-03-31 NOTE — CONSULT NOTE ADULT - ASSESSMENT
1. abdominal pain, elevated lipase. Findings c/w pancreatitis although no obvious pancreatitic inflammation on CT. Presumably secondary to sludge. CBD is dilated to 8 mm but LFTs / TBL are normal; low suspicion for biliary obstruction but would obtain MRCP for further eval.  - IVF, supportive care  - pain control per primary team   - MRCP ordered   - triglycerides ordered   - trend LFTs     2. Ileus, suspect 2/2 pancreatitis. NGT in place with bilious output.   - per surgery     3. Anxiety   - hold citalopram for now although not known to cause pancreatitis       I had a prolonged conversation with the patient regarding the hospital course, differential diagnosis, results of diagnostic tests this far, and therapeutic modalities available. Plan of care discussed with the patient after the evaluation. Patient expresses a clear understanding of the plan of care. Sixty five minutes spent on the total encounter, of which more than fifty percent of the encounter was spent on counseling and/or coordinating care by the attending physician.    Advanced care planning forms were discussed. Code status including forceful chest compressions, defibrillation and intubation were discussed. The risks benefits and alternatives to pertinent gastrointestinal procedures and interventions were discussed in detail and all questions were answered. Duration: 15 Minutes.    Ingrid Valladares M.D.   Gastroenterology and Hepatology  [address] [place], [place]  Office: [phone]  Cell: [phone]
37y F with history of chronic back pain, gastric ulcer, no prior abdominal surgical history presenting with back pain radiating to the epigastrium associated with nausea since yesterday. In the ED AVSS, WBC 18, Lipase 1798, lactate 2.3. Surgery consulted for CT findings c/w SBO vs ileus. No discrete transition point in  CT scan but gradual decrease of the caliber noted.    Plan/recommendations  - Admit to Medicine for management and workup of pancreatitis, unknown cause and without stones on ED US  - Please obtain RUQ US  - Would defer NGT placement since patient not nauseated currently, but would consult ENT for placement if needed (vomiting) to avoid recurrent nasal bleeding  - Monitor bowel function  - Pain control and abdominal exam prior to pain medications  - Rest of care per primary team  - Plan discussed and agreeable with Surgery Attending Dr. Ogden    [facility]  [id number]   
Impression:     #Epigastric abd pain likely due to pancreatitis  Unknown etiology for pancreatic inflammation, has elevated lipase 1800 w/ epigastric tenderness. Imaging shows PD dilation, w/ no cholelithiasis or choledocholithiasis. Bili and alk phos wnl. Drinks alcohol 1-2 per week w/ one glass of wine. Recently started on Citalopram one week ago.   - Other differentials include autoimmune pancreatitis, hypertriglyceridemias etc. Sertraline have shown to cause pancreatitis but its class IV, only case reports have been published in the past. It is unlikely related to Citalopram, will need to r/o other causes.     #Small bowel dilation concerning for possible partial SBO vs. ileus  Surgery following, concerned that the dilation likely due to the inflammation of the pancreas. No evidence of complete obstruction. Currently on NGT, w/ minimal o/p.     Recommendations:   - Please obtain TG and IgG 4 levels.   - Please obtain MRCP to evaluate the biliary tree.   - Continue with IV fluids.   - NPO for now.   - NGT per surgical team.   - CMP and CBC daily.     All recommendations are tentative until note is attested by an attending.     Myrna Means, PGY-4  Gastroenterology/Hepatology Fellow  Available on [employer] Teams  [phone] (Short Range Pager)  [phone] (Long Range Pager)    After 5pm, please contact the on-call GI fellow. [phone]
Patient is a 37y F with history of chronic back pain, gastric ulcer, and with prior abdominal surgical history who presented with back pain radiating to the epigastric region. Pt. also had nausea but no vomiting, fevers or chills. Patient had her last BM yesterday morning. On admission, VSS, labs showed elevated WBC 18 and lipase 1800, w/ lactic acidosis. CT A/P showed dilated stomach and proximal small bowel w/ gradual transition in the right pelvis concerning for possible partial SBO. Pt. admitted under surgery, has NGT in place and being given IV fluids. On my evaluation, patient reported she never had these symptoms before, started on citalopram one week ago and also takes Aldatone for acne. She drinks 1 glass of wine 1-2 times per week. Denied any hx of colon or pancreatic cancer. Prior to this admission, she had 3 formed stools per day for several years but never had intermittent abd pain.     # Abdominal pain,  elevated lipase  R/O pancreatitis   Dilated CBD 8 mm but LFTs / TBL are normal  low suspicion for biliary obstruction   plan for MRCP   IVF, supportive care  pain control per primary team   NGT  Zofran, aggressive hydration      # Ileus  suspect 2/2 pancreatitis  per surg team   NGT  Zofran  aggressive hydraiton     DVT and GI PPX

## 2023-03-31 NOTE — H&P ADULT - HISTORY OF PRESENT ILLNESS
37y F with history of chronic back pain, gastric ulcer, no prior abdominal surgical history presenting with back pain radiating to the epigastrium associated with nausea since yesterday. Passing flatus and had a bowel movement. Pt denies any CP, SOB, vomiting, diarrhea, or chills.    In the ED AVSS, WBC 18, Lipase 1798, lactate 2.3.     Surgery consulted for CT findings c/w SBO vs ileus. No discrete transition point in  CT scan but gradual decrease of the caliber noted.    NGT placement attempted by senior, patient immediately started having nasal bleeding that stopped with application of pressure. Patient then states she has narrow nostrils, with septum deformity and she previously had ENT placing pediatric scope to examine nasal mucosa.    Vital Signs Last 24 Hrs  T(C): 36.3 (31 Mar 2023 02:31), Max: 36.3 (31 Mar 2023 02:31)  T(F): 97.4 (31 Mar 2023 02:31), Max: 97.4 (31 Mar 2023 02:31)  HR: 65 (31 Mar 2023 05:23) (62 - 84)  BP: 103/63 (31 Mar 2023 05:23) (102/69 - 124/72)  BP(mean): 75 (30 Mar 2023 23:00) (75 - 77)  RR: 20 (31 Mar 2023 05:23) (17 - 20)  SpO2: 100% (31 Mar 2023 05:23) (96% - 100%)    Parameters below as of 31 Mar 2023 05:23  Patient On (Oxygen Delivery Method): room air

## 2023-03-31 NOTE — H&P ADULT - ASSESSMENT
37y F with history of chronic back pain, gastric ulcer, no prior abdominal surgical history presenting with back pain radiating to the epigastrium associated with nausea since yesterday. In the ED AVSS, WBC 18, Lipase 1798, lactate 2.3. Surgery consulted for CT findings c/w SBO vs ileus. No discrete transition point in  CT scan but gradual decrease of the caliber noted.    Plan/recommendations  - Admit to Surgery under Dr. Ogden  - NPO  - IVF  - NGT placement in the ED  - Monitor bowel function  - Pain control and abdominal exam prior to pain medications  - RUQ Ultrasound  - Med rec (anxiety meds?)  - Plan discussed and agreeable with Surgery Attending Dr. Ogden    [facility] Surgery  [id number]

## 2023-03-31 NOTE — H&P ADULT - ATTENDING COMMENTS
ATTENDING ATTESTATION:    37F no significant PMH or PSH with acute epigastric pain radiating to the back for 1 day.     Appears uncomfortable  Abd firm, distended, mildly tender throughout    WBC 17.9  Hb 13   LFTs normal  Lipase 1800  Lac 2.3    CT A/P: Dilated stomach and proximal small bowel with tapering to decompressed small bowel loops, possible SBO. Mildly dilated pancreatic duct. No peripancreatic inflammation or stranding. No gallstones seen.     A/P: Possible pancreatitis of unclear etiology associated with ileus vs primary mechanical SBO given presence of distended and decompressed small bowel.   - RUQ US to evaluate for cholelithiasis and CBD caliber  - 10Fr NGT placed due to history of narrow nasal passageway and deviated septum  - IVF resuscitation   - VTE prophylaxis  - admit to ACS    Total time spent in the care of this patient today (excluding critical care, teaching & procedures): 50 minutes    Over 50% of the total time was spent in discussion and coordination of care with consulting services, dietary and rehab services.    Liberty Ogden MD  Acute Care Surgery

## 2023-03-31 NOTE — H&P ADULT - NSHPLABSRESULTS_GEN_ALL_CORE
LABS:  cret                        13.6   17.96 )-----------( 195      ( 30 Mar 2023 22:27 )             40.5     03-30    142  |  103  |  14  ----------------------------<  135<H>  3.6   |  28  |  0.65    Ca    9.4      30 Mar 2023 22:27    TPro  6.6  /  Alb  4.6  /  TBili  0.5  /  DBili  x   /  AST  18  /  ALT  23  /  AlkPhos  44  03-30    PT/INR - ( 31 Mar 2023 01:13 )   PT: 13.9 sec;   INR: 1.21 ratio         PTT - ( 31 Mar 2023 01:13 )  PTT:27.8 sec          ACC: [id number] EXAM:  CT ABDOMEN AND PELVIS IC   ORDERED BY: OLYA NERI     PROCEDURE DATE:  03/30/2023          INTERPRETATION:  CLINICAL INFORMATION: Abdominal pain. Severe right flank   pain, nausea. Elevated lipase.    COMPARISON: CT abdomen pelvis 7/5/2011.    CONTRAST/COMPLICATIONS:  IV Contrast: Omnipaque 350  90 cc administered   10 cc discarded  Oral Contrast: NONE  Complications: None reported at time of study completion    PROCEDURE:  CT of the Abdomen and Pelvis was performed.  Sagittal and coronal reformats were performed.    FINDINGS:  LOWER CHEST: Within normal limits.    LIVER: Within normal limits.  BILE DUCTS: Mild intrahepatic and extrahepatic biliary duct dilatation   noted.  GALLBLADDER: Distended. No evidence ofgallstone.  SPLEEN: Within normal limits.  PANCREAS: Pancreatic duct is prominent in size, though not overly   dilated. This appears increased compared to previous exam.  ADRENALS: Within normal limits.  KIDNEYS/URETERS: Within normal limits. No urinary tract obstruction or   obstructing stone identified.    BLADDER: Within normal limits.  REPRODUCTIVE ORGANS: Intrauterine device. Retroverted uterus. 3.4 cm left   ovarian cyst.    BOWEL: The stomach and proximal small bowel are dilated and fluid-filled   measuring up to 4 cm in diameter. Feculent small bowel contents are seen   within the right mid to lower abdomen. There is no abrupt transition of   caliber, though rather a more gradual transition of caliber within the   right pelvis. There is associated wall thickening. Distal small bowel   loops are decompressed. Overall findings are suspicious for high-grade   early or partial small bowel obstruction. There is associated mesenteric   edema. This may suggest vascular compromise. The appendix is normal.  PERITONEUM: No ascites or free air.  VESSELS: Within normal limits.  RETROPERITONEUM/LYMPH NODES: No lymphadenopathy.  ABDOMINAL WALL: Within normal limits.  BONES: Within normal limits.    IMPRESSION:  Dilated stomach and proximal small bowel, without abrupt transition   though with distal small bowel loops appear decompressed. Overall   findings are compatible with high-grade early or partial small bowel   obstruction. Possibility of developing ileus is considered less likely   given the degree of caliber change.    Mild pancreatic biliary ductal prominence, nonspecific in the setting of   high-grade bowel obstruction. Endoscopy or MRCP can be obtained for   further delineation, if deemed clinically indicated.    These findings were discussed with, and acknowledged by, Dr. Roberts at    3/31/2023 12:38 AM  by Dr. Kya Robertson.    --- End of Report ---

## 2023-03-31 NOTE — CONSULT NOTE ADULT - SUBJECTIVE AND OBJECTIVE BOX
Chief Complaint:  Patient is a 37y old  Female who presents with a chief complaint of     Date of service: 23 @ 16:07    HPI:    The patient is a 37 year old woman with a PMH of NSAID-induced PUD (7 yrs ago) and anxiety, presenting for abdominal pain. Two days ago she developed acute onset back pain. Initially thought to be musculoskeletal so Rx steroids. The next day the pain progressed, radiating around to her epigastric region. No nausea or vomiting. Passing flatus, last BM yesterday. Denies fever / chills, diarrhea, bleeding symptoms or recent weight loss. WBC 18 > 10, normal Hgb, LFTs / TBL, lipase > 1700. US with dilated CBD to 8 mm, possible sludge. CT A/P shows normal appearing pancrease, prominent PD and ilues vs. partial SBO. NGT was placed for decompression. No new medications aside from Citalopram. Minimal ETOH use. No FH of malignancy.       Allergies:  No Known Allergies      Home Medications:    Hospital Medications:  dextrose 5% + sodium chloride 0.45% with potassium chloride 20 mEq/L 1000 milliLiter(s) IV Continuous <Continuous>  enoxaparin Injectable 40 milliGRAM(s) SubCutaneous every 24 hours  HYDROmorphone  Injectable 0.5 milliGRAM(s) IV Push every 4 hours PRN  ondansetron Injectable 4 milliGRAM(s) IV Push every 6 hours PRN  pantoprazole  Injectable 40 milliGRAM(s) IV Push daily      PMHX/PSHX:  Acute gastric ulcer without hemorrhage or perforation    Missed  with fetal demise before 20 completed weeks of gestation    2019 novel coronavirus disease (COVID-19)    COVID-19 vaccine series completed    History of episiotomy    S/P D&C (status post dilation and curettage)    Cervical cerclage suture present in second trimester    Cervical cerclage suture present        Family history:  No pertinent family history in first degree relatives        Social History:   Denies ethanol use.  Denies illicit drug use.    ROS:     General:  No wt loss, fevers, chills, night sweats, fatigue,   Eyes:  Good vision, no reported pain  ENT:  No sore throat, pain, runny nose, dysphagia  CV:  No pain, palpitations, hypo/hypertension  Resp:  No dyspnea, cough, tachypnea, wheezing  GI:  See HPI  :  No pain, bleeding, incontinence, nocturia  Muscle:  No pain, weakness  Neuro:  No weakness, tingling, memory problems  Psych:  No fatigue, insomnia, mood problems, depression  Endocrine:  No polyuria, polydipsia, cold/heat intolerance  Heme:  No petechiae, ecchymosis, easy bruisability  Integumentary:  No rash, edema      PHYSICAL EXAM:     GENERAL:  Appears stated age, well-groomed, well-nourished, no distress  HEENT:  NC/AT,  conjunctivae anicteric, clear and pink,   NECK: supple, trachea midline  CHEST:  Full & symmetric excursion, no increased effort, breath sounds clear  HEART:  Regular rhythm, no JVD  ABDOMEN:  Soft, non-tender, non-distended, normoactive bowel sounds,  no masses , no hepatosplenomegaly  EXTREMITIES:  no cyanosis,clubbing or edema  SKIN:  No rash, erythema, or, ecchymoses, no jaundice  NEURO:  Alert, non-focal, no asterixis  PSYCH: Appropriate affect, oriented to place and time  RECTAL: Deferred      Vital Signs:  Vital Signs Last 24 Hrs  T(C): 36.3 (31 Mar 2023 15:49), Max: 36.8 (31 Mar 2023 06:40)  T(F): 97.4 (31 Mar 2023 15:49), Max: 98.2 (31 Mar 2023 06:40)  HR: 73 (31 Mar 2023 15:49) (62 - 85)  BP: 99/64 (31 Mar 2023 15:49) (99/64 - 124/72)  BP(mean): 75 (30 Mar 2023 23:00) (75 - 77)  RR: 18 (31 Mar 2023 15:49) (17 - 20)  SpO2: 99% (31 Mar 2023 15:49) (96% - 100%)    Parameters below as of 31 Mar 2023 15:49  Patient On (Oxygen Delivery Method): room air      Daily Height in cm: 162.56 (31 Mar 2023 15:49)    Daily Weight in k (31 Mar 2023 15:49)    LABS: Labs personally reviewed by me:                        13.0   10.88 )-----------( 168      ( 31 Mar 2023 12:08 )             40.7     03-    141  |  107  |  8   ----------------------------<  121<H>  3.8   |  24  |  0.55    Ca    8.2<L>      31 Mar 2023 12:08    TPro  6.1  /  Alb  4.1  /  TBili  0.5  /  DBili  0.1  /  AST  25  /  ALT  30  /  AlkPhos  40  03-31    LIVER FUNCTIONS - ( 31 Mar 2023 12:08 )  Alb: 4.1 g/dL / Pro: 6.1 g/dL / ALK PHOS: 40 U/L / ALT: 30 U/L / AST: 25 U/L / GGT: x           PT/INR - ( 31 Mar 2023 01:13 )   PT: 13.9 sec;   INR: 1.21 ratio         PTT - ( 31 Mar 2023 01:13 )  PTT:27.8 sec  Urinalysis Basic - ( 31 Mar 2023 01:31 )    Color: Light Yellow / Appearance: Clear / SG: >1.050 / pH: x  Gluc: x / Ketone: Negative  / Bili: Negative / Urobili: Negative   Blood: x / Protein: Trace / Nitrite: Negative   Leuk Esterase: Negative / RBC: x / WBC x   Sq Epi: x / Non Sq Epi: x / Bacteria: x      Amylase Serum--      Lipase [id number]       Ammonia--      Imaging personally reviewed by me:

## 2023-03-31 NOTE — H&P ADULT - NSHPPHYSICALEXAM_GEN_ALL_CORE
Physical exam  General: NAD  Cardiac: Regular rate  Respiratory: Nonlabored breathing  Abdominal Exam: soft, nondistended, mild tenderness on the epigastric area, no rebound, no guarding  Muskuloskeletal: Moves all 4 extremities spontaneously  Neurological: Alert and oriented, no gross focal deficits, no motor or sensory deficits.  Psych: AOX3

## 2023-03-31 NOTE — CONSULT NOTE ADULT - ATTENDING COMMENTS
Patient seen and examined. Agree w above. 35 yo w complaints of back pain radiating to epigastrum w nausea without vomiting. No prior hx of above symptoms. CT noted dilated stomach and proximal SB concerning for partial SBO. Mild intra and extra hepatic biliary dilation, no gallstones, pancreatic duct prominent in size. Labs notable for elevated WBC and lipase 1800. Patient reporting drinking red wine 1-2 glasses per week. Started citalopram one week ago. unsure etiology of pancreatic inflammation. Rec MRCP. Rec IgG4 levels, triglycerides. IVF hydration, NPO w NGT decompression. Trend CBC, CMP, lipase. Supportive care.

## 2023-04-01 LAB
ALBUMIN SERPL ELPH-MCNC: 3.5 G/DL — SIGNIFICANT CHANGE UP (ref 3.3–5)
ALP SERPL-CCNC: 32 U/L — LOW (ref 40–120)
ALT FLD-CCNC: 24 U/L — SIGNIFICANT CHANGE UP (ref 10–45)
ANION GAP SERPL CALC-SCNC: 6 MMOL/L — SIGNIFICANT CHANGE UP (ref 5–17)
AST SERPL-CCNC: 16 U/L — SIGNIFICANT CHANGE UP (ref 10–40)
BILIRUB SERPL-MCNC: 0.4 MG/DL — SIGNIFICANT CHANGE UP (ref 0.2–1.2)
BUN SERPL-MCNC: <4 MG/DL — LOW (ref 7–23)
CALCIUM SERPL-MCNC: 8.2 MG/DL — LOW (ref 8.4–10.5)
CHLORIDE SERPL-SCNC: 109 MMOL/L — HIGH (ref 96–108)
CO2 SERPL-SCNC: 26 MMOL/L — SIGNIFICANT CHANGE UP (ref 22–31)
CREAT SERPL-MCNC: 0.57 MG/DL — SIGNIFICANT CHANGE UP (ref 0.5–1.3)
EGFR: 118 ML/MIN/1.73M2 — SIGNIFICANT CHANGE UP
GLUCOSE SERPL-MCNC: 109 MG/DL — HIGH (ref 70–99)
HCT VFR BLD CALC: 37.4 % — SIGNIFICANT CHANGE UP (ref 34.5–45)
HGB BLD-MCNC: 11.7 G/DL — SIGNIFICANT CHANGE UP (ref 11.5–15.5)
MAGNESIUM SERPL-MCNC: 1.8 MG/DL — SIGNIFICANT CHANGE UP (ref 1.6–2.6)
MCHC RBC-ENTMCNC: 30.2 PG — SIGNIFICANT CHANGE UP (ref 27–34)
MCHC RBC-ENTMCNC: 31.3 GM/DL — LOW (ref 32–36)
MCV RBC AUTO: 96.4 FL — SIGNIFICANT CHANGE UP (ref 80–100)
NRBC # BLD: 0 /100 WBCS — SIGNIFICANT CHANGE UP (ref 0–0)
PHOSPHATE SERPL-MCNC: 2 MG/DL — LOW (ref 2.5–4.5)
PLATELET # BLD AUTO: 134 K/UL — LOW (ref 150–400)
POTASSIUM SERPL-MCNC: 4.2 MMOL/L — SIGNIFICANT CHANGE UP (ref 3.5–5.3)
POTASSIUM SERPL-SCNC: 4.2 MMOL/L — SIGNIFICANT CHANGE UP (ref 3.5–5.3)
PROT SERPL-MCNC: 5.2 G/DL — LOW (ref 6–8.3)
RBC # BLD: 3.88 M/UL — SIGNIFICANT CHANGE UP (ref 3.8–5.2)
RBC # FLD: 12.7 % — SIGNIFICANT CHANGE UP (ref 10.3–14.5)
SODIUM SERPL-SCNC: 141 MMOL/L — SIGNIFICANT CHANGE UP (ref 135–145)
WBC # BLD: 5.17 K/UL — SIGNIFICANT CHANGE UP (ref 3.8–10.5)
WBC # FLD AUTO: 5.17 K/UL — SIGNIFICANT CHANGE UP (ref 3.8–10.5)

## 2023-04-01 PROCEDURE — 74176 CT ABD & PELVIS W/O CONTRAST: CPT | Mod: 26

## 2023-04-01 PROCEDURE — 99232 SBSQ HOSP IP/OBS MODERATE 35: CPT

## 2023-04-01 RX ORDER — POTASSIUM PHOSPHATE, MONOBASIC POTASSIUM PHOSPHATE, DIBASIC 236; 224 MG/ML; MG/ML
30 INJECTION, SOLUTION INTRAVENOUS ONCE
Refills: 0 | Status: COMPLETED | OUTPATIENT
Start: 2023-04-01 | End: 2023-04-01

## 2023-04-01 RX ORDER — MAGNESIUM SULFATE 500 MG/ML
2 VIAL (ML) INJECTION ONCE
Refills: 0 | Status: COMPLETED | OUTPATIENT
Start: 2023-04-01 | End: 2023-04-01

## 2023-04-01 RX ADMIN — Medication 400 MILLIGRAM(S): at 05:12

## 2023-04-01 RX ADMIN — ENOXAPARIN SODIUM 40 MILLIGRAM(S): 100 INJECTION SUBCUTANEOUS at 18:23

## 2023-04-01 RX ADMIN — PANTOPRAZOLE SODIUM 40 MILLIGRAM(S): 20 TABLET, DELAYED RELEASE ORAL at 11:46

## 2023-04-01 RX ADMIN — HYDROMORPHONE HYDROCHLORIDE 0.5 MILLIGRAM(S): 2 INJECTION INTRAMUSCULAR; INTRAVENOUS; SUBCUTANEOUS at 07:52

## 2023-04-01 RX ADMIN — DEXTROSE MONOHYDRATE, SODIUM CHLORIDE, AND POTASSIUM CHLORIDE 125 MILLILITER(S): 50; .745; 4.5 INJECTION, SOLUTION INTRAVENOUS at 14:18

## 2023-04-01 RX ADMIN — POTASSIUM PHOSPHATE, MONOBASIC POTASSIUM PHOSPHATE, DIBASIC 83.33 MILLIMOLE(S): 236; 224 INJECTION, SOLUTION INTRAVENOUS at 14:20

## 2023-04-01 RX ADMIN — Medication 400 MILLIGRAM(S): at 12:41

## 2023-04-01 RX ADMIN — Medication 25 GRAM(S): at 11:48

## 2023-04-01 RX ADMIN — HYDROMORPHONE HYDROCHLORIDE 0.5 MILLIGRAM(S): 2 INJECTION INTRAMUSCULAR; INTRAVENOUS; SUBCUTANEOUS at 07:37

## 2023-04-01 RX ADMIN — DEXTROSE MONOHYDRATE, SODIUM CHLORIDE, AND POTASSIUM CHLORIDE 125 MILLILITER(S): 50; .745; 4.5 INJECTION, SOLUTION INTRAVENOUS at 02:41

## 2023-04-01 RX ADMIN — Medication 400 MILLIGRAM(S): at 18:23

## 2023-04-01 RX ADMIN — Medication 10 MILLIGRAM(S): at 10:31

## 2023-04-01 NOTE — PROGRESS NOTE ADULT - TIME BILLING
I saw and evaluated patient and agree with above note  looks well  reported some flatus  I repeated CT scan and shows decompression of the intestinal obstruction  NGT removed - wasn't functioning  could have sips of clears

## 2023-04-01 NOTE — PROGRESS NOTE ADULT - ASSESSMENT
37y F with history of chronic back pain, gastric ulcer, no prior abdominal surgical history presenting with back pain radiating to the epigastrium associated with nausea since yesterday. In the ED AVSS, WBC 18, Lipase 1798, lactate 2.3. Surgery consulted for CT findings c/w SBO vs ileus. No discrete transition point in  CT scan but gradual decrease of the caliber noted.    Plan  - NPO/IVF  - Appreciate ENT upsizing of current pediatric NGT   - Monitor bowel function  - Pain control and abdominal exam prior to pain medications  - RUQ Ultrasound-> no GB wall thickening or pericholecystic edema, no stones  - Appreciate GI recs- hold Citalopram per GI for possible drug induced pancreatitis    ACS Surgery  [id number]    37y F with history of chronic back pain, gastric ulcer, no prior abdominal surgical history presenting with back pain radiating to the epigastrium associated with nausea since yesterday. In the ED AVSS, WBC 18, Lipase 1798, lactate 2.3. Surgery consulted for CT findings c/w SBO vs ileus. No discrete transition point in CT scan but gradual decrease of the caliber noted. Repeat CT on 4/1 significant for:    Plan  - NPO/IVF  - F/u repeat CTAP   - Appreciate ENT upsizing of current pediatric NGT   - Monitor bowel function  - Pain control and abdominal exam prior to pain medications  - RUQ Ultrasound-> no GB wall thickening or pericholecystic edema, no stones  - Appreciate GI recs- hold Citalopram per GI for possible drug induced pancreatitis    ACS Surgery  [id number]    37y F with history of chronic back pain, gastric ulcer, no prior abdominal surgical history presenting with back pain radiating to the epigastrium associated with nausea since yesterday. In the ED AVSS, WBC 18, Lipase 1798, lactate 2.3. Surgery consulted for CT findings c/w SBO vs ileus. No discrete transition point in CT scan but gradual decrease of the caliber noted. Repeat CT on 4/1 significant for: interval resolution of bowel dilation. Now passing gas and stool.     Plan  - Anticipate discharge today  - Advance to regular, Kosher diet  - Repeat CTAP-> interval resolution of bowel dilation  - Monitor bowel function  - Pain control and abdominal exam prior to pain medications  - RUQ Ultrasound-> no GB wall thickening or pericholecystic edema, no stones  - Appreciate GI recs- hold Citalopram per GI for possible drug induced pancreatitis    ACS Surgery  [id number]    37y F with history of chronic back pain, gastric ulcer, no prior abdominal surgical history presenting with back pain radiating to the epigastrium associated with nausea since yesterday. In the ED AVSS, WBC 18, Lipase 1798, lactate 2.3. Surgery consulted for CT findings c/w SBO vs ileus. No discrete transition point in CT scan but gradual decrease of the caliber noted. Repeat CT on 4/1 significant for: interval resolution of bowel dilation. Now passing gas and stool.     Plan  - Advance to [facility]  - Repeat CTAP-> interval resolution of bowel dilation  - Monitor bowel function  - Pain control and abdominal exam prior to pain medications  - RUQ Ultrasound-> no GB wall thickening or pericholecystic edema, no stones  - Appreciate GI recs- hold Citalopram per GI for possible drug induced pancreatitis    ACS Surgery  [id number]

## 2023-04-01 NOTE — PROGRESS NOTE ADULT - ASSESSMENT
Patient is a 37y F with history of chronic back pain, gastric ulcer, and with prior abdominal surgical history who presented with back pain radiating to the epigastric region. Pt. also had nausea but no vomiting, fevers or chills. Patient had her last BM yesterday morning. On admission, VSS, labs showed elevated WBC 18 and lipase 1800, w/ lactic acidosis. CT A/P showed dilated stomach and proximal small bowel w/ gradual transition in the right pelvis concerning for possible partial SBO. Pt. admitted under surgery, has NGT in place and being given IV fluids. On my evaluation, patient reported she never had these symptoms before, started on citalopram one week ago and also takes Aldatone for acne. She drinks 1 glass of wine 1-2 times per week. Denied any hx of colon or pancreatic cancer. Prior to this admission, she had 3 formed stools per day for several years but never had intermittent abd pain.     # Abdominal pain,  elevated lipase  R/O pancreatitis   Dilated CBD 8 mm but LFTs / TBL are normal  low suspicion for biliary obstruction   plan for MRCP   IVF, supportive care  pain control per primary team   NGT  Zofran, aggressive hydration    laxativs for bowel movement     # Ileus  suspect 2/2 pancreatitis  per surg team   NGT  Zofran  aggressive hydraiton     DVT and GI PPX

## 2023-04-02 ENCOUNTER — TRANSCRIPTION ENCOUNTER (OUTPATIENT)
Age: 38
End: 2023-04-02

## 2023-04-02 VITALS
OXYGEN SATURATION: 100 % | DIASTOLIC BLOOD PRESSURE: 64 MMHG | SYSTOLIC BLOOD PRESSURE: 108 MMHG | HEART RATE: 81 BPM | RESPIRATION RATE: 18 BRPM | TEMPERATURE: 98 F

## 2023-04-02 LAB
ALBUMIN SERPL ELPH-MCNC: 4.1 G/DL — SIGNIFICANT CHANGE UP (ref 3.3–5)
ALP SERPL-CCNC: 38 U/L — LOW (ref 40–120)
ALT FLD-CCNC: 22 U/L — SIGNIFICANT CHANGE UP (ref 10–45)
ANION GAP SERPL CALC-SCNC: 8 MMOL/L — SIGNIFICANT CHANGE UP (ref 5–17)
AST SERPL-CCNC: 16 U/L — SIGNIFICANT CHANGE UP (ref 10–40)
BILIRUB SERPL-MCNC: 0.6 MG/DL — SIGNIFICANT CHANGE UP (ref 0.2–1.2)
BUN SERPL-MCNC: 5 MG/DL — LOW (ref 7–23)
CALCIUM SERPL-MCNC: 8.9 MG/DL — SIGNIFICANT CHANGE UP (ref 8.4–10.5)
CHLORIDE SERPL-SCNC: 105 MMOL/L — SIGNIFICANT CHANGE UP (ref 96–108)
CO2 SERPL-SCNC: 27 MMOL/L — SIGNIFICANT CHANGE UP (ref 22–31)
CREAT SERPL-MCNC: 0.59 MG/DL — SIGNIFICANT CHANGE UP (ref 0.5–1.3)
EGFR: 118 ML/MIN/1.73M2 — SIGNIFICANT CHANGE UP
GLUCOSE SERPL-MCNC: 60 MG/DL — LOW (ref 70–99)
HCT VFR BLD CALC: 39.9 % — SIGNIFICANT CHANGE UP (ref 34.5–45)
HGB BLD-MCNC: 12.5 G/DL — SIGNIFICANT CHANGE UP (ref 11.5–15.5)
LIDOCAIN IGE QN: 48 U/L — SIGNIFICANT CHANGE UP (ref 7–60)
MAGNESIUM SERPL-MCNC: 2.1 MG/DL — SIGNIFICANT CHANGE UP (ref 1.6–2.6)
MCHC RBC-ENTMCNC: 29.5 PG — SIGNIFICANT CHANGE UP (ref 27–34)
MCHC RBC-ENTMCNC: 31.3 GM/DL — LOW (ref 32–36)
MCV RBC AUTO: 94.1 FL — SIGNIFICANT CHANGE UP (ref 80–100)
NRBC # BLD: 0 /100 WBCS — SIGNIFICANT CHANGE UP (ref 0–0)
PHOSPHATE SERPL-MCNC: 2.8 MG/DL — SIGNIFICANT CHANGE UP (ref 2.5–4.5)
PLATELET # BLD AUTO: 150 K/UL — SIGNIFICANT CHANGE UP (ref 150–400)
POTASSIUM SERPL-MCNC: 4 MMOL/L — SIGNIFICANT CHANGE UP (ref 3.5–5.3)
POTASSIUM SERPL-SCNC: 4 MMOL/L — SIGNIFICANT CHANGE UP (ref 3.5–5.3)
PROT SERPL-MCNC: 6.1 G/DL — SIGNIFICANT CHANGE UP (ref 6–8.3)
RBC # BLD: 4.24 M/UL — SIGNIFICANT CHANGE UP (ref 3.8–5.2)
RBC # FLD: 12.3 % — SIGNIFICANT CHANGE UP (ref 10.3–14.5)
SODIUM SERPL-SCNC: 140 MMOL/L — SIGNIFICANT CHANGE UP (ref 135–145)
WBC # BLD: 6.52 K/UL — SIGNIFICANT CHANGE UP (ref 3.8–10.5)
WBC # FLD AUTO: 6.52 K/UL — SIGNIFICANT CHANGE UP (ref 3.8–10.5)

## 2023-04-02 PROCEDURE — 85610 PROTHROMBIN TIME: CPT

## 2023-04-02 PROCEDURE — 99285 EMERGENCY DEPT VISIT HI MDM: CPT

## 2023-04-02 PROCEDURE — 80048 BASIC METABOLIC PNL TOTAL CA: CPT

## 2023-04-02 PROCEDURE — 82435 ASSAY OF BLOOD CHLORIDE: CPT

## 2023-04-02 PROCEDURE — 86901 BLOOD TYPING SEROLOGIC RH(D): CPT

## 2023-04-02 PROCEDURE — 80076 HEPATIC FUNCTION PANEL: CPT

## 2023-04-02 PROCEDURE — 82803 BLOOD GASES ANY COMBINATION: CPT

## 2023-04-02 PROCEDURE — 82565 ASSAY OF CREATININE: CPT

## 2023-04-02 PROCEDURE — 86900 BLOOD TYPING SEROLOGIC ABO: CPT

## 2023-04-02 PROCEDURE — 96375 TX/PRO/DX INJ NEW DRUG ADDON: CPT

## 2023-04-02 PROCEDURE — 84295 ASSAY OF SERUM SODIUM: CPT

## 2023-04-02 PROCEDURE — 81025 URINE PREGNANCY TEST: CPT

## 2023-04-02 PROCEDURE — 86922 COMPATIBILITY TEST ANTIGLOB: CPT

## 2023-04-02 PROCEDURE — 84132 ASSAY OF SERUM POTASSIUM: CPT

## 2023-04-02 PROCEDURE — 83690 ASSAY OF LIPASE: CPT

## 2023-04-02 PROCEDURE — 74177 CT ABD & PELVIS W/CONTRAST: CPT | Mod: MA

## 2023-04-02 PROCEDURE — 84702 CHORIONIC GONADOTROPIN TEST: CPT

## 2023-04-02 PROCEDURE — 84100 ASSAY OF PHOSPHORUS: CPT

## 2023-04-02 PROCEDURE — 96376 TX/PRO/DX INJ SAME DRUG ADON: CPT

## 2023-04-02 PROCEDURE — 85027 COMPLETE CBC AUTOMATED: CPT

## 2023-04-02 PROCEDURE — 74176 CT ABD & PELVIS W/O CONTRAST: CPT

## 2023-04-02 PROCEDURE — 36415 COLL VENOUS BLD VENIPUNCTURE: CPT

## 2023-04-02 PROCEDURE — 83735 ASSAY OF MAGNESIUM: CPT

## 2023-04-02 PROCEDURE — 99232 SBSQ HOSP IP/OBS MODERATE 35: CPT

## 2023-04-02 PROCEDURE — 85730 THROMBOPLASTIN TIME PARTIAL: CPT

## 2023-04-02 PROCEDURE — 85018 HEMOGLOBIN: CPT

## 2023-04-02 PROCEDURE — 81003 URINALYSIS AUTO W/O SCOPE: CPT

## 2023-04-02 PROCEDURE — 71045 X-RAY EXAM CHEST 1 VIEW: CPT

## 2023-04-02 PROCEDURE — 76705 ECHO EXAM OF ABDOMEN: CPT

## 2023-04-02 PROCEDURE — 80053 COMPREHEN METABOLIC PANEL: CPT

## 2023-04-02 PROCEDURE — 96374 THER/PROPH/DIAG INJ IV PUSH: CPT

## 2023-04-02 PROCEDURE — 86850 RBC ANTIBODY SCREEN: CPT

## 2023-04-02 PROCEDURE — 87637 SARSCOV2&INF A&B&RSV AMP PRB: CPT

## 2023-04-02 PROCEDURE — 85025 COMPLETE CBC W/AUTO DIFF WBC: CPT

## 2023-04-02 PROCEDURE — 85014 HEMATOCRIT: CPT

## 2023-04-02 PROCEDURE — 82330 ASSAY OF CALCIUM: CPT

## 2023-04-02 PROCEDURE — 82947 ASSAY GLUCOSE BLOOD QUANT: CPT

## 2023-04-02 PROCEDURE — 83605 ASSAY OF LACTIC ACID: CPT

## 2023-04-02 RX ORDER — ACETAMINOPHEN 500 MG
1000 TABLET ORAL EVERY 6 HOURS
Refills: 0 | Status: DISCONTINUED | OUTPATIENT
Start: 2023-04-02 | End: 2023-04-02

## 2023-04-02 RX ORDER — SODIUM,POTASSIUM PHOSPHATES 278-250MG
1 POWDER IN PACKET (EA) ORAL ONCE
Refills: 0 | Status: COMPLETED | OUTPATIENT
Start: 2023-04-02 | End: 2023-04-02

## 2023-04-02 RX ORDER — CITALOPRAM 10 MG/1
1 TABLET, FILM COATED ORAL
Refills: 0 | DISCHARGE

## 2023-04-02 RX ADMIN — Medication 1 TABLET(S): at 12:43

## 2023-04-02 RX ADMIN — PANTOPRAZOLE SODIUM 40 MILLIGRAM(S): 20 TABLET, DELAYED RELEASE ORAL at 12:44

## 2023-04-02 NOTE — DISCHARGE NOTE PROVIDER - NSDCCPCAREPLAN_GEN_ALL_CORE_FT
PRINCIPAL DISCHARGE DIAGNOSIS  Diagnosis: Bowel obstruction  Assessment and Plan of Treatment: Take Tylenol and ibuprofen as needed for mild and moderate pain. Do not resume Citalopram before consulting with your psychiatrist, PCP, and surgeon. Follow-up with Dr. Gar in his office in two weeks. Resume your other home medications, as well as your normal diet. Follow-up with Dr. Valladares in clinic in two weeks.

## 2023-04-02 NOTE — DISCHARGE NOTE PROVIDER - CARE PROVIDER_API CALL
Mark Gar)  Surgery; Surgical Critical Care  [address], [address]  [place] [address]  Phone: [phone]  Fax: [phone]  Follow Up Time: 2 weeks    Jaziel Valladares)  Gastroenterology; Internal Medicine  [address] [place] [address]  Phone: [phone]  Fax: [phone]  Follow Up Time: 2 weeks

## 2023-04-02 NOTE — DISCHARGE NOTE PROVIDER - HOSPITAL COURSE
Ms. Hernandez is a 37-year-old female with a history of chronic back pain, gastric ulcer, anxiety, no prior abdominal surgical history, who presented with back pain radiating to the epigastrium associated with nausea since 3/30. In the ED AVSS, WBC 18, Lipase 1798, lactate 2.3. Surgery consulted for CT findings c/w SBO vs ileus. No discrete transition point in CT scan but gradual decrease of the caliber noted. The patient was made NPO, given IVF, and an NGT was placed. GI was consulted to evaluate for the possibility of drug induced pancreatitis, given the patient had started Citalopram the week of admission. The following day, on 4/1 a repeat CTAP without contrast was significant for interval resolution of bowel dilation. The patient's NGT was discontinued and she was advanced ot a clear liquid diet. She continued passing gas and began passing stool, and was advanced to a solid diet on 4/2. The patient is currently tolerating a solid diet, is passing gas and stool, is voiding, and ambulating. Her vitals are stable and she is ready for discharge today.    Ms. Hernandez is a 37-year-old female with a history of chronic back pain, gastric ulcer, anxiety, no prior abdominal surgical history, who presented with back pain radiating to the epigastrium associated with nausea since 3/30. In the ED AVSS, WBC 18, Lipase 1798, lactate 2.3. Surgery consulted for CT findings c/w SBO vs ileus. No discrete transition point in CT scan but gradual decrease of the caliber noted. The patient was made NPO, given IVF, and an NGT was placed. On ultrasound, no GB wall thickening, pericholecystic edema, or gallstones were visible. GI was consulted to evaluate for the possibility of drug induced pancreatitis, given the patient had started Citalopram the week of admission. The following day, on 4/1 a repeat CTAP without contrast was significant for interval resolution of bowel dilation. The patient's NGT was discontinued and she was advanced ot a clear liquid diet. She continued passing gas and began passing stool, and was advanced to a solid diet on 4/2. The patient is currently tolerating a solid diet, is passing gas and stool, is voiding, and ambulating. Her vitals are stable and she is ready for discharge today.

## 2023-04-02 NOTE — DISCHARGE NOTE PROVIDER - NSDCMRMEDTOKEN_GEN_ALL_CORE_FT
Concerta 18 mg/24 hr oral tablet, extended release: 1 orally once a day  ibuprofen 200 mg oral tablet: 2 tab(s) orally every 6 hours, As Needed  Medrol Dosepak 4 mg oral tablet: orally  spironolactone 50 mg oral tablet: 1 orally 3 times a day  Tylenol 325 mg oral tablet: 2 tab(s) orally every 4 hours, As Needed

## 2023-04-02 NOTE — PROGRESS NOTE ADULT - SUBJECTIVE AND OBJECTIVE BOX
Chief Complaint:  Patient is a 37y old  Female who presents with a chief complaint of     Date of service 04-02-23 @ 15:20      Interval Events: feels much betterabd    Hospital Medications:  acetaminophen   IVPB .. 1000 milliGRAM(s) IV Intermittent every 6 hours PRN  enoxaparin Injectable 40 milliGRAM(s) SubCutaneous every 24 hours  HYDROmorphone  Injectable 0.5 milliGRAM(s) IV Push every 4 hours PRN  ondansetron Injectable 4 milliGRAM(s) IV Push every 6 hours PRN  pantoprazole  Injectable 40 milliGRAM(s) IV Push daily        Review of Systems:  General:  No wt loss, fevers, chills, night sweats, fatigue,   Eyes:  Good vision, no reported pain  ENT:  No sore throat, pain, runny nose, dysphagia  CV:  No pain, palpitations, hypo/hypertension  Resp:  No dyspnea, cough, tachypnea, wheezing  GI:  See HPI  :  No pain, bleeding, incontinence, nocturia  Muscle:  No pain, weakness  Neuro:  No weakness, tingling, memory problems  Psych:  No fatigue, insomnia, mood problems, depression  Endocrine:  No polyuria, polydipsia, cold/heat intolerance  Heme:  No petechiae, ecchymosis, easy bruisability  Integumentary:  No rash, edema    PHYSICAL EXAM:   Vital Signs:  Vital Signs Last 24 Hrs  T(C): 36.8 (02 Apr 2023 14:54), Max: 36.8 (02 Apr 2023 14:54)  T(F): 98.2 (02 Apr 2023 14:54), Max: 98.2 (02 Apr 2023 14:54)  HR: 81 (02 Apr 2023 14:54) (56 - 81)  BP: 108/64 (02 Apr 2023 14:54) (105/63 - 109/57)  BP(mean): --  RR: 18 (02 Apr 2023 14:54) (18 - 18)  SpO2: 100% (02 Apr 2023 14:54) (99% - 100%)    Parameters below as of 02 Apr 2023 14:54  Patient On (Oxygen Delivery Method): room air      Daily     Daily       PHYSICAL EXAM:     GENERAL:  Appears stated age, well-groomed, well-nourished, no distress  HEENT:  NC/AT,  conjunctivae anicteric, clear and pink,   NECK: supple, trachea midline  CHEST:  Full & symmetric excursion, no increased effort, breath sounds clear  HEART:  Regular rhythm, no JVD  ABDOMEN:  Soft, non-tender, non-distended, normoactive bowel sounds,  no masses , no hepatosplenomegaly  EXTREMITIES:  no cyanosis,clubbing or edema  SKIN:  No rash, erythema, or, ecchymoses, no jaundice  NEURO:  Alert, non-focal, no asterixis  PSYCH: Appropriate affect, oriented to place and time  RECTAL: Deferred      LABS Personally reviewed by me:                        12.5   6.52  )-----------( 150      ( 02 Apr 2023 06:59 )             39.9     Mean Cell Volume: 94.1 fl (04-02-23 @ 06:59)    04-02    140  |  105  |  5<L>  ----------------------------<  60<L>  4.0   |  27  |  0.59    Ca    8.9      02 Apr 2023 06:58  Phos  2.8     04-02  Mg     2.1     04-02    TPro  6.1  /  Alb  4.1  /  TBili  0.6  /  DBili  x   /  AST  16  /  ALT  22  /  AlkPhos  38<L>  04-02    LIVER FUNCTIONS - ( 02 Apr 2023 06:58 )  Alb: 4.1 g/dL / Pro: 6.1 g/dL / ALK PHOS: 38 U/L / ALT: 22 U/L / AST: 16 U/L / GGT: x               Amylase Serum--      Lipase serum48       Ammonia--                          12.5   6.52  )-----------( 150      ( 02 Apr 2023 06:59 )             39.9                         11.7   5.17  )-----------( 134      ( 01 Apr 2023 05:23 )             37.4                         13.0   10.88 )-----------( 168      ( 31 Mar 2023 12:08 )             40.7                         12.6   11.07 )-----------( 176      ( 31 Mar 2023 06:15 )             39.1                         13.6   17.96 )-----------( 195      ( 30 Mar 2023 22:27 )             40.5       Imaging personally reviewed by me:          
 Pt seen this morning , complaining of right flank pain , NG tube is capped./   pt received Dilaudid and pain improved. pt with 2 hard BM oavernight      acetaminophen   IVPB .. 1000 milliGRAM(s) IV Intermittent every 6 hours  dextrose 5% + sodium chloride 0.45% with potassium chloride 20 mEq/L 1000 milliLiter(s) IV Continuous <Continuous>  enoxaparin Injectable 40 milliGRAM(s) SubCutaneous every 24 hours  HYDROmorphone  Injectable 0.5 milliGRAM(s) IV Push every 4 hours PRN  ondansetron Injectable 4 milliGRAM(s) IV Push every 6 hours PRN  pantoprazole  Injectable 40 milliGRAM(s) IV Push daily                            11.7   5.17  )-----------( 134      ( 01 Apr 2023 05:23 )             37.4       Hemoglobin: 11.7 g/dL (04-01 @ 05:23)  Hemoglobin: 13.0 g/dL (03-31 @ 12:08)  Hemoglobin: 12.6 g/dL (03-31 @ 06:15)  Hemoglobin: 13.6 g/dL (03-30 @ 22:27)      04-01    141  |  109<H>  |  <4<L>  ----------------------------<  109<H>  4.2   |  26  |  0.57    Ca    8.2<L>      01 Apr 2023 05:23  Phos  2.0     04-01  Mg     1.8     04-01    TPro  5.2<L>  /  Alb  3.5  /  TBili  0.4  /  DBili  x   /  AST  16  /  ALT  24  /  AlkPhos  32<L>  04-01    Creatinine Trend: 0.57<--, 0.55<--, 0.54<--, 0.65<--    COAGS:           T(C): 36.7 (04-01-23 @ 00:09), Max: 36.7 (04-01-23 @ 00:09)  HR: 59 (04-01-23 @ 00:09) (59 - 73)  BP: 94/59 (04-01-23 @ 00:09) (94/59 - 105/67)  RR: 18 (04-01-23 @ 00:09) (18 - 18)  SpO2: 98% (04-01-23 @ 00:09) (97% - 99%)  Wt(kg): --    I&O's Summary    31 Mar 2023 07:01  -  01 Apr 2023 07:00  --------------------------------------------------------  IN: 1250 mL / OUT: 40 mL / NET: 1210 mL      General:  No wt loss, fevers, chills, night sweats, fatigue,   Eyes:  Good vision, no reported pain  ENT:  No sore throat, pain, runny nose, dysphagia  CV:  No pain, palpitations, hypo/hypertension  Resp:  No dyspnea, cough, tachypnea, wheezing  GI:  See HPI  :  No pain, bleeding, incontinence, nocturia  Muscle:  No pain, weakness  Neuro:  No weakness, tingling, memory problems  Psych:  No fatigue, insomnia, mood problems, depression  Endocrine:  No polyuria, polydipsia, cold/heat intolerance  Heme:  No petechiae, ecchymosis, easy bruisability  Integumentary:  No rash, edema      PHYSICAL EXAM:     GENERAL:  Appears stated age, well-groomed, well-nourished, no distress  HEENT:  NC/AT,  conjunctivae anicteric, clear and pink,   NECK: supple, trachea midline  CHEST:  Full & symmetric excursion, no increased effort, breath sounds clear  HEART:  Regular rhythm, no JVD  ABDOMEN:  Soft, non-tender, non-distended, normoactive bowel sounds,  no masses , no hepatosplenomegaly  EXTREMITIES:  no cyanosis,clubbing or edema  SKIN:  No rash, erythema, or, ecchymoses, no jaundice  NEURO:  Alert, non-focal, no asterixis  PSYCH: Appropriate affect, oriented to place and time  RECTAL: Deferred      A/P 	  1. abdominal pain, elevated lipase. Findings c/w pancreatitis although no obvious pancreatitic inflammation on CT. Presumably secondary to sludge. CBD is dilated to 8 mm but LFTs / TBL are normal; low suspicion for biliary obstruction but would obtain MRCP for further eval.  - IVF, supportive care  - pain control per primary team   - MRCP   - triglycerides pending   -  lft unchanged   - ng tube to suction \    2. Ileus, suspect 2/2 pancreatitis. NGT in place with bilious output.   - per surgery     3. Anxiety   - hold citalopram for now although not known to cause pancreatitis        
GENERAL SURGERY PROGRESS NOTE    SUBJECTIVE  No acute events overnight. Feeling well on morning rounds.     10-point review of systems completed and negative except as noted above.      OBJECTIVE    MEDICATIONS  acetaminophen   IVPB .. 1000 milliGRAM(s) IV Intermittent every 6 hours PRN  enoxaparin Injectable 40 milliGRAM(s) SubCutaneous every 24 hours  HYDROmorphone  Injectable 0.5 milliGRAM(s) IV Push every 4 hours PRN  ondansetron Injectable 4 milliGRAM(s) IV Push every 6 hours PRN  pantoprazole  Injectable 40 milliGRAM(s) IV Push daily  potassium phosphate / sodium phosphate Tablet (K-PHOS No. 2) 1 Tablet(s) Oral once      PHYSICAL EXAM  T(C): 36.6 (04-01-23 @ 16:45), Max: 36.7 (04-01-23 @ 12:41)  HR: 56 (04-02-23 @ 04:40) (56 - 66)  BP: 105/63 (04-02-23 @ 04:40) (100/63 - 109/57)  RR: 18 (04-02-23 @ 04:40) (18 - 18)  SpO2: 100% (04-02-23 @ 04:40) (97% - 100%)    04-01-23 @ 07:01  -  04-02-23 @ 07:00  --------------------------------------------------------  IN: 240 mL / OUT: 0 mL / NET: 240 mL      General: NAD  Cardiac: Regular rate  Respiratory: Nonlabored breathing  Abdominal Exam: soft, nondistended, mild tenderness on the epigastric area, no rebound, no guarding.   Muskuloskeletal: Moves all 4 extremities spontaneously  Neurological: Alert and oriented, no gross focal deficits, no motor or sensory deficits.  Psych: Appropriate affect    LABS                        12.5   6.52  )-----------( 150      ( 02 Apr 2023 06:59 )             39.9     04-02    140  |  105  |  5<L>  ----------------------------<  60<L>  4.0   |  27  |  0.59    Ca    8.9      02 Apr 2023 06:58  Phos  2.8     04-02  Mg     2.1     04-02    TPro  6.1  /  Alb  4.1  /  TBili  0.6  /  DBili  x   /  AST  16  /  ALT  22  /  AlkPhos  38<L>  04-02          RADIOLOGY & ADDITIONAL STUDIES  
GENERAL SURGERY PROGRESS NOTE    SUBJECTIVE  No acute events overnight. Seen and examined on morning rounds.     10-point review of systems completed and negative except as noted above.      OBJECTIVE    MEDICATIONS  acetaminophen   IVPB .. 1000 milliGRAM(s) IV Intermittent every 6 hours  dextrose 5% + sodium chloride 0.45% with potassium chloride 20 mEq/L 1000 milliLiter(s) IV Continuous <Continuous>  enoxaparin Injectable 40 milliGRAM(s) SubCutaneous every 24 hours  HYDROmorphone  Injectable 0.5 milliGRAM(s) IV Push every 4 hours PRN  ondansetron Injectable 4 milliGRAM(s) IV Push every 6 hours PRN  pantoprazole  Injectable 40 milliGRAM(s) IV Push daily      PHYSICAL EXAM  T(C): 36.7 (04-01-23 @ 00:09), Max: 36.7 (04-01-23 @ 00:09)  HR: 59 (04-01-23 @ 00:09) (59 - 73)  BP: 94/59 (04-01-23 @ 00:09) (94/59 - 105/67)  RR: 18 (04-01-23 @ 00:09) (18 - 18)  SpO2: 98% (04-01-23 @ 00:09) (97% - 99%)    03-31-23 @ 07:01  -  04-01-23 @ 07:00  --------------------------------------------------------  IN: 1250 mL / OUT: 40 mL / NET: 1210 mL        General: NAD  Cardiac: Regular rate  Respiratory: Nonlabored breathing  Abdominal Exam: soft, nondistended, mild tenderness on the epigastric area, no rebound, no guarding. NGT in place with minimal output  Muskuloskeletal: Moves all 4 extremities spontaneously  Neurological: Alert and oriented, no gross focal deficits, no motor or sensory deficits.  Psych: AOX3      LABS                        11.7   5.17  )-----------( 134      ( 01 Apr 2023 05:23 )             37.4     04-01    141  |  109<H>  |  <4<L>  ----------------------------<  109<H>  4.2   |  26  |  0.57    Ca    8.2<L>      01 Apr 2023 05:23  Phos  2.0     04-01  Mg     1.8     04-01    TPro  5.2<L>  /  Alb  3.5  /  TBili  0.4  /  DBili  x   /  AST  16  /  ALT  24  /  AlkPhos  32<L>  04-01    PT/INR - ( 31 Mar 2023 01:13 )   PT: 13.9 sec;   INR: 1.21 ratio         PTT - ( 31 Mar 2023 01:13 )  PTT:27.8 sec  Urinalysis Basic - ( 31 Mar 2023 01:31 )    Color: Light Yellow / Appearance: Clear / SG: >1.050 / pH: x  Gluc: x / Ketone: Negative  / Bili: Negative / Urobili: Negative   Blood: x / Protein: Trace / Nitrite: Negative   Leuk Esterase: Negative / RBC: x / WBC x   Sq Epi: x / Non Sq Epi: x / Bacteria: x        RADIOLOGY & ADDITIONAL STUDIES  
Name of Patient : LENNY GRIJALVA  MRN: [id number]  Date of visit: 04-02-23      Subjective: Patient seen and examined. No new events except as noted.   Doing okay     REVIEW OF SYSTEMS:    CONSTITUTIONAL: No weakness, fevers or chills  EYES/ENT: No visual changes;  No vertigo or throat pain   NECK: No pain or stiffness  RESPIRATORY: No cough, wheezing, hemoptysis; No shortness of breath  CARDIOVASCULAR: No chest pain or palpitations  GASTROINTESTINAL: No abdominal or epigastric pain. No nausea, vomiting, or hematemesis; No diarrhea or constipation. No melena or hematochezia.  GENITOURINARY: No dysuria, frequency or hematuria  NEUROLOGICAL: No numbness or weakness  SKIN: No itching, burning, rashes, or lesions   All other review of systems is negative unless indicated above.    MEDICATIONS:  MEDICATIONS  (STANDING):  enoxaparin Injectable 40 milliGRAM(s) SubCutaneous every 24 hours  pantoprazole  Injectable 40 milliGRAM(s) IV Push daily      PHYSICAL EXAM:  T(C): 36.8 (04-02-23 @ 14:54), Max: 36.8 (04-02-23 @ 14:54)  HR: 81 (04-02-23 @ 14:54) (56 - 81)  BP: 108/64 (04-02-23 @ 14:54) (105/63 - 109/57)  RR: 18 (04-02-23 @ 14:54) (18 - 18)  SpO2: 100% (04-02-23 @ 14:54) (99% - 100%)  Wt(kg): --  I&O's Summary    01 Apr 2023 07:01  -  02 Apr 2023 07:00  --------------------------------------------------------  IN: 240 mL / OUT: 0 mL / NET: 240 mL          Appearance: Normal	  HEENT:  PERRLA   Lymphatic: No lymphadenopathy   Cardiovascular: Normal S1 S2, no JVD  Respiratory: normal effort , clear  Gastrointestinal:  Soft, Non-tender  Skin: No rashes,  warm to touch  Psychiatry:  Mood & affect appropriate  Musculuskeletal: No edema    recent labs, Imaging and EKGs personally reviewed       04-01-23 @ 07:01  -  04-02-23 @ 07:00  --------------------------------------------------------  IN: 240 mL / OUT: 0 mL / NET: 240 mL                          12.5   6.52  )-----------( 150      ( 02 Apr 2023 06:59 )             39.9               04-02    140  |  105  |  5<L>  ----------------------------<  60<L>  4.0   |  27  |  0.59    Ca    8.9      02 Apr 2023 06:58  Phos  2.8     04-02  Mg     2.1     04-02    TPro  6.1  /  Alb  4.1  /  TBili  0.6  /  DBili  x   /  AST  16  /  ALT  22  /  AlkPhos  38<L>  04-02                                 
Name of Patient : LENNY GRIJALVA  MRN: [id number]  Date of visit: 04-01-23       Subjective: Patient seen and examined. No new events except as noted.   passed small amount of gas  nausea improved  NGT intact     REVIEW OF SYSTEMS:    CONSTITUTIONAL: No weakness, fevers or chills  EYES/ENT: No visual changes;  No vertigo or throat pain   NECK: No pain or stiffness  RESPIRATORY: No cough, wheezing, hemoptysis; No shortness of breath  CARDIOVASCULAR: No chest pain or palpitations  GASTROINTESTINAL: + abdominal pain, back pain   GENITOURINARY: No dysuria, frequency or hematuria  NEUROLOGICAL: No numbness or weakness  SKIN: No itching, burning, rashes, or lesions   All other review of systems is negative unless indicated above.    MEDICATIONS:  MEDICATIONS  (STANDING):  dextrose 5% + sodium chloride 0.45% with potassium chloride 20 mEq/L 1000 milliLiter(s) (125 mL/Hr) IV Continuous <Continuous>  enoxaparin Injectable 40 milliGRAM(s) SubCutaneous every 24 hours  pantoprazole  Injectable 40 milliGRAM(s) IV Push daily      PHYSICAL EXAM:  T(C): 36.6 (04-01-23 @ 16:45), Max: 36.7 (04-01-23 @ 00:09)  HR: 64 (04-01-23 @ 16:45) (59 - 64)  BP: 108/69 (04-01-23 @ 16:45) (94/59 - 108/69)  RR: 18 (04-01-23 @ 16:45) (18 - 18)  SpO2: 99% (04-01-23 @ 16:45) (97% - 99%)  Wt(kg): --  I&O's Summary    31 Mar 2023 07:01  -  01 Apr 2023 07:00  --------------------------------------------------------  IN: 1250 mL / OUT: 40 mL / NET: 1210 mL    01 Apr 2023 07:01  -  01 Apr 2023 20:24  --------------------------------------------------------  IN: 240 mL / OUT: 0 mL / NET: 240 mL    Appearance: Normal	  HEENT:  PERRLA , NGT   Lymphatic: No lymphadenopathy   Cardiovascular: Normal S1 S2, no JVD  Respiratory: normal effort , clear  Gastrointestinal:  Soft, Non-tender  Skin: No rashes,  warm to touch  Psychiatry:  Mood & affect appropriate  Musculuskeletal: No edema    recent labs, Imaging and EKGs personally reviewed     03-31-23 @ 07:01  -  04-01-23 @ 07:00  --------------------------------------------------------  IN: 1250 mL / OUT: 40 mL / NET: 1210 mL    04-01-23 @ 07:01  -  04-01-23 @ 20:24  --------------------------------------------------------  IN: 240 mL / OUT: 0 mL / NET: 240 mL                          11.7   5.17  )-----------( 134      ( 01 Apr 2023 05:23 )             37.4               04-01    141  |  109<H>  |  <4<L>  ----------------------------<  109<H>  4.2   |  26  |  0.57    Ca    8.2<L>      01 Apr 2023 05:23  Phos  2.0     04-01  Mg     1.8     04-01    TPro  5.2<L>  /  Alb  3.5  /  TBili  0.4  /  DBili  x   /  AST  16  /  ALT  24  /  AlkPhos  32<L>  04-01    PT/INR - ( 31 Mar 2023 01:13 )   PT: 13.9 sec;   INR: 1.21 ratio         PTT - ( 31 Mar 2023 01:13 )  PTT:27.8 sec                   Urinalysis Basic - ( 31 Mar 2023 01:31 )    Color: Light Yellow / Appearance: Clear / SG: >1.050 / pH: x  Gluc: x / Ketone: Negative  / Bili: Negative / Urobili: Negative   Blood: x / Protein: Trace / Nitrite: Negative   Leuk Esterase: Negative / RBC: x / WBC x   Sq Epi: x / Non Sq Epi: x / Bacteria: x

## 2023-04-02 NOTE — PROGRESS NOTE ADULT - ASSESSMENT
37y F with history of chronic back pain, gastric ulcer, no prior abdominal surgical history presenting with back pain radiating to the epigastrium associated with nausea since yesterday. In the ED AVSS, WBC 18, Lipase 1798, lactate 2.3. Surgery consulted for CT findings c/w SBO vs ileus. No discrete transition point in CT scan but gradual decrease of the caliber noted. Repeat CT on 4/1 significant for: interval resolution of bowel dilation. Now passing gas and stool.     Plan  - Discharge today  - Advance to regular, Kosher diet  - Repeat CTAP-> interval resolution of bowel dilation  - Monitor bowel function  - Pain control and abdominal exam prior to pain medications  - RUQ Ultrasound-> no GB wall thickening or pericholecystic edema, no stones  - Appreciate GI recs- hold Citalopram per GI for possible drug induced pancreatitis    ACS Surgery  [id number]

## 2023-04-02 NOTE — DISCHARGE NOTE PROVIDER - PROVIDER TOKENS
PROVIDER:[TOKEN:[[id number]],FOLLOWUP:[2 weeks]],PROVIDER:[TOKEN:[[id number]],FOLLOWUP:[2 weeks]]

## 2023-04-02 NOTE — PROGRESS NOTE ADULT - TIME BILLING
I saw and evaluated patient and agree with residents note  doing well  tolerating diet  anticipate discharge today  I explained to patient need to follow up with G.I. as an outpatient - although I suspect that patient had a Bezoar should consider capsule endoscopy or other form of G.I. follow up since this is a primary small bowel obstruction and likely not secondary to pancreatitis.  Patient agreed I saw and evaluated patient and agree with residents note  doing well  tolerating diet  anticipate discharge today  I explained to patient need to follow up with G.I. as an outpatient - although I suspect that patient had a Bezoar that has now passed but should consider capsule endoscopy or other form of G.I. follow up since this is a primary small bowel obstruction and likely not secondary to pancreatitis.  Patient agreed

## 2023-04-02 NOTE — PROGRESS NOTE ADULT - ASSESSMENT
Patient is a 37y F with history of chronic back pain, gastric ulcer, and with prior abdominal surgical history who presented with back pain radiating to the epigastric region. Pt. also had nausea but no vomiting, fevers or chills. Patient had her last BM yesterday morning. On admission, VSS, labs showed elevated WBC 18 and lipase 1800, w/ lactic acidosis. CT A/P showed dilated stomach and proximal small bowel w/ gradual transition in the right pelvis concerning for possible partial SBO. Pt. admitted under surgery, has NGT in place and being given IV fluids. On my evaluation, patient reported she never had these symptoms before, started on citalopram one week ago and also takes Aldatone for acne. She drinks 1 glass of wine 1-2 times per week. Denied any hx of colon or pancreatic cancer. Prior to this admission, she had 3 formed stools per day for several years but never had intermittent abd pain.     # Abdominal pain,  elevated lipase  R/O pancreatitis   Dilated CBD 8 mm but LFTs / TBL are normal  low suspicion for biliary obstruction   IVF, supportive care  pain control per primary team   NGT removed   Zofran, aggressive hydration    laxativs for bowel movement     # Ileus  suspect 2/2 pancreatitis  per surg team   Zofran  aggressive hydraiton     DVT and GI PPX

## 2023-04-02 NOTE — DISCHARGE NOTE PROVIDER - CARE PROVIDERS DIRECT ADDRESSES
,[email or web address]

## 2023-04-02 NOTE — DISCHARGE NOTE NURSING/CASE MANAGEMENT/SOCIAL WORK - NSDCVIVACCINE_GEN_ALL_CORE_FT
Tdap; 23-Aug-2013 02:00; Angela Solomon (GABRIELLE); [employer]; [id number] (Exp. Date: 18-Sep-2015); IM; LArm; 0.5 cc; VIS (VIS Published: 23-Aug-2013);

## 2023-04-02 NOTE — DISCHARGE NOTE NURSING/CASE MANAGEMENT/SOCIAL WORK - PATIENT PORTAL LINK FT
You can access the FollowMyHealth Patient Portal offered by [facility] by registering at the following website: [email or web address]. By joining [facility]’s FollowMyHealth portal, you will also be able to view your health information using other applications (apps) compatible with our system.

## 2023-04-04 PROBLEM — Z92.29 PERSONAL HISTORY OF OTHER DRUG THERAPY: Chronic | Status: ACTIVE | Noted: 2023-03-30

## 2023-04-04 PROBLEM — U07.1 COVID-19: Chronic | Status: ACTIVE | Noted: 2023-03-30

## 2023-04-17 ENCOUNTER — APPOINTMENT (OUTPATIENT)
Dept: MRI IMAGING | Facility: IMAGING CENTER | Age: 38
End: 2023-04-17
Payer: COMMERCIAL

## 2023-04-17 ENCOUNTER — OUTPATIENT (OUTPATIENT)
Dept: OUTPATIENT SERVICES | Facility: HOSPITAL | Age: 38
LOS: 1 days | End: 2023-04-17
Payer: COMMERCIAL

## 2023-04-17 DIAGNOSIS — Z00.8 ENCOUNTER FOR OTHER GENERAL EXAMINATION: ICD-10-CM

## 2023-04-17 DIAGNOSIS — K83.9 DISEASE OF BILIARY TRACT, UNSPECIFIED: ICD-10-CM

## 2023-04-17 DIAGNOSIS — O34.30 MATERNAL CARE FOR CERVICAL INCOMPETENCE, UNSPECIFIED TRIMESTER: Chronic | ICD-10-CM

## 2023-04-17 DIAGNOSIS — Z98.890 OTHER SPECIFIED POSTPROCEDURAL STATES: Chronic | ICD-10-CM

## 2023-04-17 PROCEDURE — 74183 MRI ABD W/O CNTR FLWD CNTR: CPT | Mod: 26

## 2023-04-17 PROCEDURE — 74183 MRI ABD W/O CNTR FLWD CNTR: CPT

## 2023-04-17 PROCEDURE — A9585: CPT

## 2023-04-20 ENCOUNTER — APPOINTMENT (OUTPATIENT)
Dept: SURGERY | Facility: CLINIC | Age: 38
End: 2023-04-20

## 2023-08-01 NOTE — DISCHARGE NOTE ANTEPARTUM - REASON FOR ADMISSION
Patient has an echo at 10:30 for 75 mins so she said she probably won't make it at 11:30.   Please advise   Thank you   She just had an echo done when she was in the hospital. There is no reason to have it repeated. This order was put in before she was admitted.     Pt agreeable to plan.  Echo cancelled.  Pt to have sutures removed on day of appt as well to prevent coming back and forth to the hospital.  Pt will reach out to staff if she decides she would like sutures out earlier.    Pt appreciative.   
TCM attempt #2, left voicemail for patient to return call to clinic. Phone number provided. Awaiting response. Also requested patient call to schedule a TCM follow up appointment. Unable to get a hold of patient. Encounter closed    No TCM appointment scheduled     PSR, please try to contact patient to help schedule TCM appointment. Thank you!  
Writer attempted to contact Jacqueline for TCM left voice message for a return call to the clinic. Discharged 7/28 Shortness of Breath, Bilateral Pulmonary Embolism, Medically Noncompliance, Chest pain. TCM needs to be scheduled.  
emesis, abdominal/back pain

## 2023-08-11 NOTE — H&P PST ADULT - PSYCHIATRIC
From: Marie Bernstein  To: Debo Wang  Sent: 8/11/2023 3:13 PM CDT  Subject: Stomach     Good afternoon I received your message about Imani. She is doing the same she had stomach pain Monday evening after dinner. Lasted about 1 hour then went away. Everything else is still the same. I know she sees the gastroenterologist in October. Hopefully they can figure this weird issue out. Thank you for checking Lea   negative Affect and characteristics of appearance, verbalizations, behaviors are appropriate

## 2023-08-14 NOTE — ASU PATIENT PROFILE, ADULT - CENTRAL VENOUS CATHETER
Pt is requesting new rx for    Freestyle ashleigh 3 sensor    Did not see on active med list please verify and send new rx    [facility]  [phone]     no

## 2024-01-30 ENCOUNTER — APPOINTMENT (OUTPATIENT)
Dept: OBGYN | Facility: CLINIC | Age: 39
End: 2024-01-30
Payer: COMMERCIAL

## 2024-01-30 PROCEDURE — 99459 PELVIC EXAMINATION: CPT

## 2024-01-30 PROCEDURE — 99386 PREV VISIT NEW AGE 40-64: CPT

## 2024-01-30 PROCEDURE — 76830 TRANSVAGINAL US NON-OB: CPT

## 2024-01-30 PROCEDURE — 81002 URINALYSIS NONAUTO W/O SCOPE: CPT

## 2024-01-31 NOTE — ED ADULT NURSE NOTE - TEMPLATE LIST FOR HEAD TO TOE ASSESSMENT
Department of Emergency Medicine    FIRST PROVIDER TRIAGE NOTE             Independent MLP           1/31/24  2:23 PM EST    Date of Encounter: 1/31/24   MRN: [id number]    Vitals:    01/31/24 1421   BP: (!) 150/73   Pulse: 62   Resp: 16   Temp: 98.2 °F (36.8 °C)   TempSrc: Oral   SpO2: 99%   Weight: 70.3 kg (155 lb)   Height: 1.854 m (6' 1\")      HPI: Keshav Daly is a 63 y.o. male who presents to the ED for Leg Pain (Left leg tenderness/redness/drainage. Sent by urgent care for US )     Hx DVT 35 years ago  Not currently on anticoagulation     ROS: Negative for cp or sob.    Physical Exam:   Gen Appearance/Constitutional: alert  CV: regular rate     Initial Plan of Care: All treatment areas with department are currently occupied.     Plan to order/Initiate the following while awaiting opening in ED: Triage evaluation  labs and imaging studies.    Initial Plan of Care: Initiate Treatment-Testing, Proceed toTreatment Area When Bed Available for ED Attending/MLP to Continue Care    Electronically signed by Lou Servin PA-C   DD: 1/31/24    
Abdominal Pain, N/V/D

## 2024-11-26 NOTE — ED PROVIDER NOTE - NS HIV RISK FACTOR YES

## 2025-03-13 NOTE — ED ADULT TRIAGE NOTE - CHIEF COMPLAINT QUOTE
[de-identified] : 1/20/25: JAMIE PRATT is a 29 year old male here with right hip pain. Pain has been present for three months and is located laterally. Rates pain as /10 on pain scale. Referred by Dr. Mckeon.  Injury - hip pain began after a long 30 miles of running Mechanical symptoms - none Exacerbating factors/activities/positions - walking, running, prolonged sitting Pain during or after activity - both Back pain - no Radicular pain - no Previous Treatment: seen by Dr. Mckeon NSAIDs: yes, but no relief PT: yes in the past, no relief Activity Mods: yes Surgery: no Injections: no Occupation: sales Sports/Recreational Activities: running  03/06/25: Follow up for the right hip. States his hip is feeling the same since his last visit. Experiencing an aching pain laterally, taking Tylenol and Ibuprofen if needed. Patient is attending PT 1x a week.   3/13/25: ***NEW ISSUE** LEFT hip pain for 1-2 weeks. NKI, insidious, came on while walking at gym, burning feeling. Patient reports he is scheduled for RIGHT hip surgery on 6/4/25 and feels the left hip has similar symptoms, not as bad. 
vomiting/12 weeks pregnant

## 2025-06-24 NOTE — H&P ADULT - HIV OFFER
LVM to return call to discuss earlier appt for MRI with sedation.   
Spoke to patient she was seen by Dr. Duron on 6/20 full H&P done but pt will see ROMERO Collado for pre-op/anesthesia clearance 6/26.  Notified [facility] requesting order state general anesthesia or anesthesia monitored sedation - they will fax new order,  Paperwork faxed to PAT.     MRI of Brain w/ IAC w/ w/out contrast with anesthesia scheduled 7/3 @ 0700  Discussed with patient arrival to Day Surgery 2nd floor of [facility] @ 0530 (1.5 hours prior to scheduled MRI).  Patient aware PAT will contact within 1 week of scheduled MRI to review medical history.  Paperwork faxed () to PAT by JERMAN SCHMIDT.  
Opt out

## 2025-07-25 VITALS
RESPIRATION RATE: 16 BRPM | OXYGEN SATURATION: 97 % | HEART RATE: 55 BPM | TEMPERATURE: 100 F | SYSTOLIC BLOOD PRESSURE: 124 MMHG | DIASTOLIC BLOOD PRESSURE: 69 MMHG

## 2025-07-25 LAB
ALBUMIN SERPL ELPH-MCNC: 4.4 G/DL — SIGNIFICANT CHANGE UP (ref 3.3–5)
ALP SERPL-CCNC: 49 U/L — SIGNIFICANT CHANGE UP (ref 40–120)
ALT FLD-CCNC: 31 U/L — SIGNIFICANT CHANGE UP (ref 10–45)
ANION GAP SERPL CALC-SCNC: 12 MMOL/L — SIGNIFICANT CHANGE UP (ref 5–17)
APPEARANCE UR: ABNORMAL
AST SERPL-CCNC: 28 U/L — SIGNIFICANT CHANGE UP (ref 10–40)
BACTERIA # UR AUTO: NEGATIVE /HPF — SIGNIFICANT CHANGE UP
BASOPHILS # BLD AUTO: 0.06 K/UL — SIGNIFICANT CHANGE UP (ref 0–0.2)
BASOPHILS NFR BLD AUTO: 0.4 % — SIGNIFICANT CHANGE UP (ref 0–2)
BILIRUB SERPL-MCNC: 0.3 MG/DL — SIGNIFICANT CHANGE UP (ref 0.2–1.2)
BILIRUB UR-MCNC: NEGATIVE — SIGNIFICANT CHANGE UP
BUN SERPL-MCNC: 11 MG/DL — SIGNIFICANT CHANGE UP (ref 7–23)
CALCIUM SERPL-MCNC: 9.2 MG/DL — SIGNIFICANT CHANGE UP (ref 8.4–10.5)
CAST: 0 /LPF — SIGNIFICANT CHANGE UP (ref 0–4)
CHLORIDE SERPL-SCNC: 102 MMOL/L — SIGNIFICANT CHANGE UP (ref 96–108)
CO2 SERPL-SCNC: 21 MMOL/L — LOW (ref 22–31)
COLOR SPEC: YELLOW — SIGNIFICANT CHANGE UP
CREAT SERPL-MCNC: 0.61 MG/DL — SIGNIFICANT CHANGE UP (ref 0.5–1.3)
DIFF PNL FLD: NEGATIVE — SIGNIFICANT CHANGE UP
EGFR: 115 ML/MIN/1.73M2 — SIGNIFICANT CHANGE UP
EGFR: 115 ML/MIN/1.73M2 — SIGNIFICANT CHANGE UP
EOSINOPHIL # BLD AUTO: 0.02 K/UL — SIGNIFICANT CHANGE UP (ref 0–0.5)
EOSINOPHIL NFR BLD AUTO: 0.1 % — SIGNIFICANT CHANGE UP (ref 0–6)
GAS PNL BLDV: SIGNIFICANT CHANGE UP
GLUCOSE SERPL-MCNC: 104 MG/DL — HIGH (ref 70–99)
GLUCOSE UR QL: NEGATIVE MG/DL — SIGNIFICANT CHANGE UP
HCG SERPL-ACNC: <2 MIU/ML — SIGNIFICANT CHANGE UP
HCT VFR BLD CALC: 38.2 % — SIGNIFICANT CHANGE UP (ref 34.5–45)
HGB BLD-MCNC: 12.7 G/DL — SIGNIFICANT CHANGE UP (ref 11.5–15.5)
IMM GRANULOCYTES # BLD AUTO: 0.06 K/UL — SIGNIFICANT CHANGE UP (ref 0–0.07)
IMM GRANULOCYTES NFR BLD AUTO: 0.4 % — SIGNIFICANT CHANGE UP (ref 0–0.9)
KETONES UR QL: 80 MG/DL
LEUKOCYTE ESTERASE UR-ACNC: NEGATIVE — SIGNIFICANT CHANGE UP
LIDOCAIN IGE QN: 27 U/L — SIGNIFICANT CHANGE UP (ref 7–60)
LYMPHOCYTES # BLD AUTO: 2.02 K/UL — SIGNIFICANT CHANGE UP (ref 1–3.3)
LYMPHOCYTES NFR BLD AUTO: 13.8 % — SIGNIFICANT CHANGE UP (ref 13–44)
MAGNESIUM SERPL-MCNC: 1.8 MG/DL — SIGNIFICANT CHANGE UP (ref 1.6–2.6)
MCHC RBC-ENTMCNC: 30.8 PG — SIGNIFICANT CHANGE UP (ref 27–34)
MCHC RBC-ENTMCNC: 33.2 G/DL — SIGNIFICANT CHANGE UP (ref 32–36)
MCV RBC AUTO: 92.7 FL — SIGNIFICANT CHANGE UP (ref 80–100)
MONOCYTES # BLD AUTO: 0.63 K/UL — SIGNIFICANT CHANGE UP (ref 0–0.9)
MONOCYTES NFR BLD AUTO: 4.3 % — SIGNIFICANT CHANGE UP (ref 2–14)
NEUTROPHILS # BLD AUTO: 11.83 K/UL — HIGH (ref 1.8–7.4)
NEUTROPHILS NFR BLD AUTO: 81 % — HIGH (ref 43–77)
NITRITE UR-MCNC: NEGATIVE — SIGNIFICANT CHANGE UP
NRBC # BLD AUTO: 0 K/UL — SIGNIFICANT CHANGE UP (ref 0–0)
NRBC # FLD: 0 K/UL — SIGNIFICANT CHANGE UP (ref 0–0)
NRBC BLD AUTO-RTO: 0 /100 WBCS — SIGNIFICANT CHANGE UP (ref 0–0)
PH UR: 8 — SIGNIFICANT CHANGE UP (ref 5–8)
PLATELET # BLD AUTO: 180 K/UL — SIGNIFICANT CHANGE UP (ref 150–400)
PMV BLD: 11.4 FL — SIGNIFICANT CHANGE UP (ref 7–13)
POTASSIUM SERPL-MCNC: 4.1 MMOL/L — SIGNIFICANT CHANGE UP (ref 3.5–5.3)
POTASSIUM SERPL-SCNC: 4.1 MMOL/L — SIGNIFICANT CHANGE UP (ref 3.5–5.3)
PROT SERPL-MCNC: 6.7 G/DL — SIGNIFICANT CHANGE UP (ref 6–8.3)
PROT UR-MCNC: NEGATIVE MG/DL — SIGNIFICANT CHANGE UP
RBC # BLD: 4.12 M/UL — SIGNIFICANT CHANGE UP (ref 3.8–5.2)
RBC # FLD: 12.5 % — SIGNIFICANT CHANGE UP (ref 10.3–14.5)
RBC CASTS # UR COMP ASSIST: 0 /HPF — SIGNIFICANT CHANGE UP (ref 0–4)
SODIUM SERPL-SCNC: 135 MMOL/L — SIGNIFICANT CHANGE UP (ref 135–145)
SP GR SPEC: 1.02 — SIGNIFICANT CHANGE UP (ref 1–1.03)
SQUAMOUS # UR AUTO: 0 /HPF — SIGNIFICANT CHANGE UP (ref 0–5)
UROBILINOGEN FLD QL: 0.2 MG/DL — SIGNIFICANT CHANGE UP (ref 0.2–1)
WBC # BLD: 14.62 K/UL — HIGH (ref 3.8–10.5)
WBC # FLD AUTO: 14.62 K/UL — HIGH (ref 3.8–10.5)
WBC UR QL: 0 /HPF — SIGNIFICANT CHANGE UP (ref 0–5)

## 2025-07-25 PROCEDURE — 99285 EMERGENCY DEPT VISIT HI MDM: CPT

## 2025-07-25 PROCEDURE — 76705 ECHO EXAM OF ABDOMEN: CPT | Mod: 26

## 2025-07-25 PROCEDURE — 93975 VASCULAR STUDY: CPT | Mod: 26

## 2025-07-25 PROCEDURE — 76830 TRANSVAGINAL US NON-OB: CPT | Mod: 26

## 2025-07-25 RX ORDER — FUROSEMIDE 10 MG/ML
20 INJECTION INTRAMUSCULAR; INTRAVENOUS ONCE
Refills: 0 | Status: DISCONTINUED | OUTPATIENT
Start: 2025-07-25 | End: 2025-07-25

## 2025-07-25 RX ORDER — ACETAMINOPHEN 500 MG/5ML
1000 LIQUID (ML) ORAL ONCE
Refills: 0 | Status: COMPLETED | OUTPATIENT
Start: 2025-07-25 | End: 2025-07-25

## 2025-07-25 RX ORDER — ONDANSETRON HCL/PF 4 MG/2 ML
4 VIAL (ML) INJECTION ONCE
Refills: 0 | Status: COMPLETED | OUTPATIENT
Start: 2025-07-25 | End: 2025-07-25

## 2025-07-25 RX ADMIN — Medication 400 MILLIGRAM(S): at 20:49

## 2025-07-25 RX ADMIN — Medication 4 MILLIGRAM(S): at 21:39

## 2025-07-25 RX ADMIN — Medication 4 MILLIGRAM(S): at 23:03

## 2025-07-25 RX ADMIN — Medication 1000 MILLILITER(S): at 20:49

## 2025-07-25 RX ADMIN — Medication 20 MILLIGRAM(S): at 23:58

## 2025-07-25 RX ADMIN — Medication 4 MILLIGRAM(S): at 23:59

## 2025-07-25 RX ADMIN — Medication 4 MILLIGRAM(S): at 20:48

## 2025-07-25 RX ADMIN — Medication 1000 MILLIGRAM(S): at 23:02

## 2025-07-26 ENCOUNTER — TRANSCRIPTION ENCOUNTER (OUTPATIENT)
Age: 40
End: 2025-07-26

## 2025-07-26 ENCOUNTER — OUTPATIENT (OUTPATIENT)
Dept: EMERGENCY DEPT | Facility: HOSPITAL | Age: 40
LOS: 1 days | End: 2025-07-26
Payer: COMMERCIAL

## 2025-07-26 ENCOUNTER — RESULT REVIEW (OUTPATIENT)
Age: 40
End: 2025-07-26

## 2025-07-26 VITALS
RESPIRATION RATE: 16 BRPM | HEART RATE: 68 BPM | SYSTOLIC BLOOD PRESSURE: 101 MMHG | OXYGEN SATURATION: 98 % | DIASTOLIC BLOOD PRESSURE: 50 MMHG

## 2025-07-26 DIAGNOSIS — R10.9 UNSPECIFIED ABDOMINAL PAIN: ICD-10-CM

## 2025-07-26 DIAGNOSIS — Z98.890 OTHER SPECIFIED POSTPROCEDURAL STATES: Chronic | ICD-10-CM

## 2025-07-26 DIAGNOSIS — O34.30 MATERNAL CARE FOR CERVICAL INCOMPETENCE, UNSPECIFIED TRIMESTER: Chronic | ICD-10-CM

## 2025-07-26 LAB
APTT BLD: 24.3 SEC — LOW (ref 26.1–36.8)
BLD GP AB SCN SERPL QL: NEGATIVE — SIGNIFICANT CHANGE UP
INR BLD: 0.97 RATIO — SIGNIFICANT CHANGE UP (ref 0.85–1.16)
PROTHROM AB SERPL-ACNC: 11.2 SEC — SIGNIFICANT CHANGE UP (ref 9.9–13.4)
RH IG SCN BLD-IMP: POSITIVE — SIGNIFICANT CHANGE UP

## 2025-07-26 PROCEDURE — 82435 ASSAY OF BLOOD CHLORIDE: CPT

## 2025-07-26 PROCEDURE — 74177 CT ABD & PELVIS W/CONTRAST: CPT

## 2025-07-26 PROCEDURE — 84295 ASSAY OF SERUM SODIUM: CPT

## 2025-07-26 PROCEDURE — 76705 ECHO EXAM OF ABDOMEN: CPT

## 2025-07-26 PROCEDURE — 86901 BLOOD TYPING SEROLOGIC RH(D): CPT

## 2025-07-26 PROCEDURE — 88304 TISSUE EXAM BY PATHOLOGIST: CPT | Mod: 26

## 2025-07-26 PROCEDURE — 82803 BLOOD GASES ANY COMBINATION: CPT

## 2025-07-26 PROCEDURE — 84132 ASSAY OF SERUM POTASSIUM: CPT

## 2025-07-26 PROCEDURE — 86850 RBC ANTIBODY SCREEN: CPT

## 2025-07-26 PROCEDURE — 44970 LAPAROSCOPY APPENDECTOMY: CPT

## 2025-07-26 PROCEDURE — 88304 TISSUE EXAM BY PATHOLOGIST: CPT

## 2025-07-26 PROCEDURE — 93005 ELECTROCARDIOGRAM TRACING: CPT

## 2025-07-26 PROCEDURE — C9399: CPT

## 2025-07-26 PROCEDURE — 83605 ASSAY OF LACTIC ACID: CPT

## 2025-07-26 PROCEDURE — 81001 URINALYSIS AUTO W/SCOPE: CPT

## 2025-07-26 PROCEDURE — 85014 HEMATOCRIT: CPT

## 2025-07-26 PROCEDURE — 86900 BLOOD TYPING SEROLOGIC ABO: CPT

## 2025-07-26 PROCEDURE — 80053 COMPREHEN METABOLIC PANEL: CPT

## 2025-07-26 PROCEDURE — 86922 COMPATIBILITY TEST ANTIGLOB: CPT

## 2025-07-26 PROCEDURE — 93975 VASCULAR STUDY: CPT

## 2025-07-26 PROCEDURE — 76830 TRANSVAGINAL US NON-OB: CPT

## 2025-07-26 PROCEDURE — 85018 HEMOGLOBIN: CPT

## 2025-07-26 PROCEDURE — 74177 CT ABD & PELVIS W/CONTRAST: CPT | Mod: 26

## 2025-07-26 PROCEDURE — 83735 ASSAY OF MAGNESIUM: CPT

## 2025-07-26 PROCEDURE — 85025 COMPLETE CBC W/AUTO DIFF WBC: CPT

## 2025-07-26 PROCEDURE — 82947 ASSAY GLUCOSE BLOOD QUANT: CPT

## 2025-07-26 PROCEDURE — C1889: CPT

## 2025-07-26 PROCEDURE — 82330 ASSAY OF CALCIUM: CPT

## 2025-07-26 PROCEDURE — 84702 CHORIONIC GONADOTROPIN TEST: CPT

## 2025-07-26 PROCEDURE — 85730 THROMBOPLASTIN TIME PARTIAL: CPT

## 2025-07-26 PROCEDURE — 83690 ASSAY OF LIPASE: CPT

## 2025-07-26 PROCEDURE — 85610 PROTHROMBIN TIME: CPT

## 2025-07-26 DEVICE — STAPLER COVIDIEN TRI-STAPLE 45MM PURPLE RELOAD: Type: IMPLANTABLE DEVICE | Status: FUNCTIONAL

## 2025-07-26 DEVICE — SURGICEL POWDER 3 GRAMS: Type: IMPLANTABLE DEVICE | Status: FUNCTIONAL

## 2025-07-26 RX ORDER — METRONIDAZOLE 250 MG
500 TABLET ORAL EVERY 8 HOURS
Refills: 0 | Status: DISCONTINUED | OUTPATIENT
Start: 2025-07-26 | End: 2025-07-26

## 2025-07-26 RX ORDER — METRONIDAZOLE 250 MG
500 TABLET ORAL EVERY 12 HOURS
Refills: 0 | Status: DISCONTINUED | OUTPATIENT
Start: 2025-07-26 | End: 2025-07-26

## 2025-07-26 RX ORDER — ONDANSETRON HCL/PF 4 MG/2 ML
4 VIAL (ML) INJECTION ONCE
Refills: 0 | Status: DISCONTINUED | OUTPATIENT
Start: 2025-07-26 | End: 2025-07-26

## 2025-07-26 RX ORDER — HYDROMORPHONE/SOD CHLOR,ISO/PF 2 MG/10 ML
0.5 SYRINGE (ML) INJECTION EVERY 4 HOURS
Refills: 0 | Status: DISCONTINUED | OUTPATIENT
Start: 2025-07-26 | End: 2025-07-26

## 2025-07-26 RX ORDER — ACETAMINOPHEN 500 MG/5ML
1000 LIQUID (ML) ORAL EVERY 6 HOURS
Refills: 0 | Status: DISCONTINUED | OUTPATIENT
Start: 2025-07-26 | End: 2025-07-26

## 2025-07-26 RX ORDER — CEFTRIAXONE 500 MG/1
1000 INJECTION, POWDER, FOR SOLUTION INTRAMUSCULAR; INTRAVENOUS EVERY 24 HOURS
Refills: 0 | Status: DISCONTINUED | OUTPATIENT
Start: 2025-07-26 | End: 2025-07-26

## 2025-07-26 RX ORDER — SODIUM CHLORIDE 9 G/1000ML
1000 INJECTION, SOLUTION INTRAVENOUS
Refills: 0 | Status: DISCONTINUED | OUTPATIENT
Start: 2025-07-26 | End: 2025-07-26

## 2025-07-26 RX ORDER — KETOROLAC TROMETHAMINE 30 MG/ML
15 INJECTION, SOLUTION INTRAMUSCULAR; INTRAVENOUS ONCE
Refills: 0 | Status: DISCONTINUED | OUTPATIENT
Start: 2025-07-26 | End: 2025-07-26

## 2025-07-26 RX ORDER — HYDROMORPHONE/SOD CHLOR,ISO/PF 2 MG/10 ML
0.25 SYRINGE (ML) INJECTION
Refills: 0 | Status: DISCONTINUED | OUTPATIENT
Start: 2025-07-26 | End: 2025-07-26

## 2025-07-26 RX ORDER — OXYCODONE HYDROCHLORIDE 30 MG/1
2.5 TABLET ORAL EVERY 4 HOURS
Refills: 0 | Status: DISCONTINUED | OUTPATIENT
Start: 2025-07-26 | End: 2025-07-26

## 2025-07-26 RX ORDER — OXYCODONE HYDROCHLORIDE 30 MG/1
1 TABLET ORAL
Qty: 10 | Refills: 0
Start: 2025-07-26

## 2025-07-26 RX ORDER — CEFTRIAXONE 500 MG/1
INJECTION, POWDER, FOR SOLUTION INTRAMUSCULAR; INTRAVENOUS
Refills: 0 | Status: DISCONTINUED | OUTPATIENT
Start: 2025-07-26 | End: 2025-07-26

## 2025-07-26 RX ORDER — ENOXAPARIN SODIUM 100 MG/ML
40 INJECTION SUBCUTANEOUS EVERY 24 HOURS
Refills: 0 | Status: DISCONTINUED | OUTPATIENT
Start: 2025-07-26 | End: 2025-07-26

## 2025-07-26 RX ORDER — HYDROMORPHONE/SOD CHLOR,ISO/PF 2 MG/10 ML
0.2 SYRINGE (ML) INJECTION EVERY 4 HOURS
Refills: 0 | Status: DISCONTINUED | OUTPATIENT
Start: 2025-07-26 | End: 2025-07-26

## 2025-07-26 RX ORDER — ACETAMINOPHEN 500 MG/5ML
1000 LIQUID (ML) ORAL ONCE
Refills: 0 | Status: COMPLETED | OUTPATIENT
Start: 2025-07-26 | End: 2025-07-26

## 2025-07-26 RX ORDER — SODIUM CHLORIDE 9 G/1000ML
1000 INJECTION, SOLUTION INTRAVENOUS ONCE
Refills: 0 | Status: COMPLETED | OUTPATIENT
Start: 2025-07-26 | End: 2025-07-26

## 2025-07-26 RX ORDER — OXYCODONE HYDROCHLORIDE 30 MG/1
5 TABLET ORAL EVERY 4 HOURS
Refills: 0 | Status: DISCONTINUED | OUTPATIENT
Start: 2025-07-26 | End: 2025-07-26

## 2025-07-26 RX ADMIN — Medication 4 MILLIGRAM(S): at 02:57

## 2025-07-26 RX ADMIN — SODIUM CHLORIDE 1000 MILLILITER(S): 9 INJECTION, SOLUTION INTRAVENOUS at 01:15

## 2025-07-26 RX ADMIN — Medication 0.5 MILLIGRAM(S): at 05:48

## 2025-07-26 RX ADMIN — Medication 400 MILLIGRAM(S): at 02:45

## 2025-07-26 RX ADMIN — SODIUM CHLORIDE 75 MILLILITER(S): 9 INJECTION, SOLUTION INTRAVENOUS at 08:46

## 2025-07-28 RX ORDER — VORTIOXETINE 20 MG/1
1 TABLET, FILM COATED ORAL
Refills: 0 | DISCHARGE

## 2025-07-28 RX ORDER — OXYCODONE HYDROCHLORIDE 30 MG/1
1 TABLET ORAL
Refills: 0 | DISCHARGE

## 2025-08-06 ENCOUNTER — EMERGENCY (EMERGENCY)
Facility: HOSPITAL | Age: 40
LOS: 1 days | End: 2025-08-06
Attending: EMERGENCY MEDICINE
Payer: COMMERCIAL

## 2025-08-06 ENCOUNTER — APPOINTMENT (OUTPATIENT)
Dept: TRAUMA SURGERY | Facility: CLINIC | Age: 40
End: 2025-08-06

## 2025-08-06 VITALS
DIASTOLIC BLOOD PRESSURE: 75 MMHG | RESPIRATION RATE: 18 BRPM | SYSTOLIC BLOOD PRESSURE: 116 MMHG | OXYGEN SATURATION: 100 % | HEIGHT: 64 IN | HEART RATE: 64 BPM | TEMPERATURE: 98 F | WEIGHT: 121.92 LBS

## 2025-08-06 DIAGNOSIS — O34.30 MATERNAL CARE FOR CERVICAL INCOMPETENCE, UNSPECIFIED TRIMESTER: Chronic | ICD-10-CM

## 2025-08-06 DIAGNOSIS — Z98.890 OTHER SPECIFIED POSTPROCEDURAL STATES: Chronic | ICD-10-CM

## 2025-08-06 PROCEDURE — 99285 EMERGENCY DEPT VISIT HI MDM: CPT

## 2025-08-06 RX ORDER — CEPHALEXIN 250 MG/1
1 CAPSULE ORAL
Qty: 14 | Refills: 0
Start: 2025-08-06

## 2025-08-06 RX ORDER — SULFAMETHOXAZOLE/TRIMETHOPRIM 800-160 MG
1 TABLET ORAL
Qty: 14 | Refills: 0
Start: 2025-08-06

## 2025-08-07 VITALS
SYSTOLIC BLOOD PRESSURE: 101 MMHG | RESPIRATION RATE: 18 BRPM | OXYGEN SATURATION: 100 % | HEART RATE: 63 BPM | DIASTOLIC BLOOD PRESSURE: 67 MMHG | TEMPERATURE: 98 F

## 2025-08-07 LAB
ALBUMIN SERPL ELPH-MCNC: 4.3 G/DL — SIGNIFICANT CHANGE UP (ref 3.3–5)
ALP SERPL-CCNC: 67 U/L — SIGNIFICANT CHANGE UP (ref 40–120)
ALT FLD-CCNC: 34 U/L — SIGNIFICANT CHANGE UP (ref 10–45)
ANION GAP SERPL CALC-SCNC: 16 MMOL/L — SIGNIFICANT CHANGE UP (ref 5–17)
AST SERPL-CCNC: 19 U/L — SIGNIFICANT CHANGE UP (ref 10–40)
BASOPHILS # BLD AUTO: 0.03 K/UL — SIGNIFICANT CHANGE UP (ref 0–0.2)
BASOPHILS NFR BLD AUTO: 0.4 % — SIGNIFICANT CHANGE UP (ref 0–2)
BILIRUB SERPL-MCNC: 0.1 MG/DL — LOW (ref 0.2–1.2)
BUN SERPL-MCNC: 12 MG/DL — SIGNIFICANT CHANGE UP (ref 7–23)
CALCIUM SERPL-MCNC: 9.3 MG/DL — SIGNIFICANT CHANGE UP (ref 8.4–10.5)
CHLORIDE SERPL-SCNC: 103 MMOL/L — SIGNIFICANT CHANGE UP (ref 96–108)
CO2 SERPL-SCNC: 21 MMOL/L — LOW (ref 22–31)
CREAT SERPL-MCNC: 0.7 MG/DL — SIGNIFICANT CHANGE UP (ref 0.5–1.3)
EGFR: 111 ML/MIN/1.73M2 — SIGNIFICANT CHANGE UP
EGFR: 111 ML/MIN/1.73M2 — SIGNIFICANT CHANGE UP
EOSINOPHIL # BLD AUTO: 0.13 K/UL — SIGNIFICANT CHANGE UP (ref 0–0.5)
EOSINOPHIL NFR BLD AUTO: 1.9 % — SIGNIFICANT CHANGE UP (ref 0–6)
GAS PNL BLDV: SIGNIFICANT CHANGE UP
GLUCOSE SERPL-MCNC: 84 MG/DL — SIGNIFICANT CHANGE UP (ref 70–99)
HCG SERPL-ACNC: <2 MIU/ML — SIGNIFICANT CHANGE UP
HCT VFR BLD CALC: 37.9 % — SIGNIFICANT CHANGE UP (ref 34.5–45)
HGB BLD-MCNC: 12.1 G/DL — SIGNIFICANT CHANGE UP (ref 11.5–15.5)
IMM GRANULOCYTES # BLD AUTO: 0.03 K/UL — SIGNIFICANT CHANGE UP (ref 0–0.07)
IMM GRANULOCYTES NFR BLD AUTO: 0.4 % — SIGNIFICANT CHANGE UP (ref 0–0.9)
LIDOCAIN IGE QN: 53 U/L — SIGNIFICANT CHANGE UP (ref 7–60)
LYMPHOCYTES # BLD AUTO: 2.68 K/UL — SIGNIFICANT CHANGE UP (ref 1–3.3)
LYMPHOCYTES NFR BLD AUTO: 38.9 % — SIGNIFICANT CHANGE UP (ref 13–44)
MCHC RBC-ENTMCNC: 30.2 PG — SIGNIFICANT CHANGE UP (ref 27–34)
MCHC RBC-ENTMCNC: 31.9 G/DL — LOW (ref 32–36)
MCV RBC AUTO: 94.5 FL — SIGNIFICANT CHANGE UP (ref 80–100)
MONOCYTES # BLD AUTO: 0.54 K/UL — SIGNIFICANT CHANGE UP (ref 0–0.9)
MONOCYTES NFR BLD AUTO: 7.8 % — SIGNIFICANT CHANGE UP (ref 2–14)
NEUTROPHILS # BLD AUTO: 3.48 K/UL — SIGNIFICANT CHANGE UP (ref 1.8–7.4)
NEUTROPHILS NFR BLD AUTO: 50.6 % — SIGNIFICANT CHANGE UP (ref 43–77)
NRBC # BLD AUTO: 0 K/UL — SIGNIFICANT CHANGE UP (ref 0–0)
NRBC # FLD: 0 K/UL — SIGNIFICANT CHANGE UP (ref 0–0)
NRBC BLD AUTO-RTO: 0 /100 WBCS — SIGNIFICANT CHANGE UP (ref 0–0)
PLATELET # BLD AUTO: 172 K/UL — SIGNIFICANT CHANGE UP (ref 150–400)
PMV BLD: 11.4 FL — SIGNIFICANT CHANGE UP (ref 7–13)
POTASSIUM SERPL-MCNC: 4 MMOL/L — SIGNIFICANT CHANGE UP (ref 3.5–5.3)
POTASSIUM SERPL-SCNC: 4 MMOL/L — SIGNIFICANT CHANGE UP (ref 3.5–5.3)
PROT SERPL-MCNC: 6.6 G/DL — SIGNIFICANT CHANGE UP (ref 6–8.3)
RBC # BLD: 4.01 M/UL — SIGNIFICANT CHANGE UP (ref 3.8–5.2)
RBC # FLD: 12.7 % — SIGNIFICANT CHANGE UP (ref 10.3–14.5)
SODIUM SERPL-SCNC: 140 MMOL/L — SIGNIFICANT CHANGE UP (ref 135–145)
WBC # BLD: 6.89 K/UL — SIGNIFICANT CHANGE UP (ref 3.8–10.5)
WBC # FLD AUTO: 6.89 K/UL — SIGNIFICANT CHANGE UP (ref 3.8–10.5)

## 2025-08-07 PROCEDURE — 85018 HEMOGLOBIN: CPT

## 2025-08-07 PROCEDURE — 82947 ASSAY GLUCOSE BLOOD QUANT: CPT

## 2025-08-07 PROCEDURE — 84295 ASSAY OF SERUM SODIUM: CPT

## 2025-08-07 PROCEDURE — 82435 ASSAY OF BLOOD CHLORIDE: CPT

## 2025-08-07 PROCEDURE — 84132 ASSAY OF SERUM POTASSIUM: CPT

## 2025-08-07 PROCEDURE — 74177 CT ABD & PELVIS W/CONTRAST: CPT

## 2025-08-07 PROCEDURE — 83605 ASSAY OF LACTIC ACID: CPT

## 2025-08-07 PROCEDURE — 82803 BLOOD GASES ANY COMBINATION: CPT

## 2025-08-07 PROCEDURE — 82330 ASSAY OF CALCIUM: CPT

## 2025-08-07 PROCEDURE — 36000 PLACE NEEDLE IN VEIN: CPT

## 2025-08-07 PROCEDURE — 84702 CHORIONIC GONADOTROPIN TEST: CPT

## 2025-08-07 PROCEDURE — 85014 HEMATOCRIT: CPT

## 2025-08-07 PROCEDURE — 80053 COMPREHEN METABOLIC PANEL: CPT

## 2025-08-07 PROCEDURE — 83690 ASSAY OF LIPASE: CPT

## 2025-08-07 PROCEDURE — 99284 EMERGENCY DEPT VISIT MOD MDM: CPT | Mod: 25

## 2025-08-07 PROCEDURE — 74177 CT ABD & PELVIS W/CONTRAST: CPT | Mod: 26

## 2025-08-07 PROCEDURE — 85025 COMPLETE CBC W/AUTO DIFF WBC: CPT

## 2025-08-07 RX ADMIN — Medication 1000 MILLILITER(S): at 02:13

## 2025-08-11 LAB — SURGICAL PATHOLOGY STUDY: SIGNIFICANT CHANGE UP

## (undated) DEVICE — WARMING BLANKET UPPER ADULT

## (undated) DEVICE — SOL IRR POUR NS 0.9% 500ML

## (undated) DEVICE — BLADE SCALPEL SAFETYLOCK #10

## (undated) DEVICE — SOL IRR POUR H2O 250ML

## (undated) DEVICE — TUBING INSUFFLATION LAP FILTER 10FT

## (undated) DEVICE — D HELP - CLEARVIEW CLEARIFY SYSTEM

## (undated) DEVICE — TROCAR APPLIED MEDICAL KII BALLOON BLUNT TIP 12MM X 100MM

## (undated) DEVICE — LIGASURE MARYLAND 5MM X 37CM

## (undated) DEVICE — LIGASURE IMPACT

## (undated) DEVICE — TUBING IRRIGATION DAVOL SYSTEM X STREAM

## (undated) DEVICE — Device

## (undated) DEVICE — PACK LAP CHOLE LAP APPENDECTOMY

## (undated) DEVICE — GOWN TRIMAX LG

## (undated) DEVICE — DISSECTOR ENDO PEANUT 5MM

## (undated) DEVICE — SPECIMEN CONTAINER 100ML

## (undated) DEVICE — DRSG TEGADERM 6 X 8"

## (undated) DEVICE — DRSG OPSITE 2.5 X 2"

## (undated) DEVICE — VENODYNE/SCD SLEEVE CALF MEDIUM

## (undated) DEVICE — ENDOCATCH 10MM

## (undated) DEVICE — MEDICATION LABELS W MARKER

## (undated) DEVICE — ELCTR BOVIE PENCIL SMOKE EVACUATION

## (undated) DEVICE — BLADE SCALPEL SAFETYLOCK #15

## (undated) DEVICE — DRSG STERISTRIPS 0.5 X 4"

## (undated) DEVICE — LIGASURE BLUNT TIP 5MM X 37CM

## (undated) DEVICE — APPLICATOR SURGICEL LAP TROCAR POINT 2.5MM X 150MM

## (undated) DEVICE — PREP CHLORAPREP HI-LITE ORANGE 26ML

## (undated) DEVICE — TROCAR COVIDIEN BLUNT TIP HASSAN 12MM

## (undated) DEVICE — SUT BIOSYN 4-0 18" P-12

## (undated) DEVICE — TUBING STRYKEFLOW II SUCTION / IRRIGATOR

## (undated) DEVICE — STAPLER COVIDIEN ENDO GIA STANDARD HANDLE

## (undated) DEVICE — INSUFFLATION NDL COVIDIEN STEP 14G FOR STEP/VERSASTEP

## (undated) DEVICE — SUT POLYSORB 0 36" GU-46

## (undated) DEVICE — SUT MONOCRYL 4-0 18" PS-2

## (undated) DEVICE — POSITIONER FOAM EGG CRATE ULNAR 2PCS (PINK)

## (undated) DEVICE — DRAPE TOWEL BLUE 17" X 24"